# Patient Record
Sex: MALE | Race: BLACK OR AFRICAN AMERICAN | NOT HISPANIC OR LATINO | URBAN - METROPOLITAN AREA
[De-identification: names, ages, dates, MRNs, and addresses within clinical notes are randomized per-mention and may not be internally consistent; named-entity substitution may affect disease eponyms.]

---

## 2017-10-28 ENCOUNTER — EMERGENCY (EMERGENCY)
Facility: HOSPITAL | Age: 27
LOS: 1 days | Discharge: HOME | End: 2017-10-28
Attending: EMERGENCY MEDICINE
Payer: MEDICAID

## 2017-10-28 VITALS
DIASTOLIC BLOOD PRESSURE: 84 MMHG | RESPIRATION RATE: 20 BRPM | WEIGHT: 300.05 LBS | HEART RATE: 95 BPM | SYSTOLIC BLOOD PRESSURE: 164 MMHG | TEMPERATURE: 99 F | OXYGEN SATURATION: 97 %

## 2017-10-28 PROCEDURE — 99283 EMERGENCY DEPT VISIT LOW MDM: CPT

## 2017-10-28 PROCEDURE — 99283 EMERGENCY DEPT VISIT LOW MDM: CPT | Mod: 25

## 2017-10-28 RX ORDER — ALBUTEROL 90 UG/1
2 AEROSOL, METERED ORAL
Qty: 1 | Refills: 0 | OUTPATIENT
Start: 2017-10-28 | End: 2017-11-27

## 2017-10-28 RX ORDER — ALBUTEROL 90 UG/1
2.5 AEROSOL, METERED ORAL
Qty: 0 | Refills: 0 | Status: DISCONTINUED | OUTPATIENT
Start: 2017-10-28 | End: 2017-11-01

## 2017-10-28 NOTE — ED PROVIDER NOTE - OBJECTIVE STATEMENT
27 y/o male with PMHx asthma presents to the ED with c/o SOB, onset 5 hours ago. Pt admits to being around a smoke filled environment tonight. Reports chest tightness but denies smoking, n/v, fever, chills, and any other acute symptoms and complaints at this time.

## 2017-10-28 NOTE — ED ADULT NURSE NOTE - NS ED NURSE DC INFO COMPLEXITY
Returned Demonstration/Simple: Patient demonstrates quick and easy understanding/Patient asked questions

## 2017-10-28 NOTE — ED PROVIDER NOTE - CONSTITUTIONAL, MLM
normal... Well appearing, overweight, well nourished, awake, alert, oriented to person, place, time/situation and in no apparent distress.

## 2019-08-14 ENCOUNTER — EMERGENCY (EMERGENCY)
Facility: HOSPITAL | Age: 29
LOS: 1 days | End: 2019-08-14
Admitting: EMERGENCY MEDICINE
Payer: MEDICAID

## 2019-08-14 PROCEDURE — 76700 US EXAM ABDOM COMPLETE: CPT | Mod: 26

## 2019-08-14 PROCEDURE — 99285 EMERGENCY DEPT VISIT HI MDM: CPT

## 2019-09-17 ENCOUNTER — EMERGENCY (EMERGENCY)
Facility: HOSPITAL | Age: 29
LOS: 1 days | End: 2019-09-17
Admitting: EMERGENCY MEDICINE
Payer: MEDICAID

## 2019-09-17 PROCEDURE — 99284 EMERGENCY DEPT VISIT MOD MDM: CPT

## 2019-12-23 ENCOUNTER — EMERGENCY (EMERGENCY)
Facility: HOSPITAL | Age: 29
LOS: 1 days | End: 2019-12-23
Admitting: EMERGENCY MEDICINE
Payer: SELF-PAY

## 2019-12-23 PROCEDURE — 99053 MED SERV 10PM-8AM 24 HR FAC: CPT

## 2019-12-23 PROCEDURE — 99282 EMERGENCY DEPT VISIT SF MDM: CPT

## 2020-02-14 ENCOUNTER — EMERGENCY (EMERGENCY)
Facility: HOSPITAL | Age: 30
LOS: 1 days | End: 2020-02-14
Admitting: EMERGENCY MEDICINE
Payer: MEDICAID

## 2020-02-14 PROCEDURE — 99284 EMERGENCY DEPT VISIT MOD MDM: CPT

## 2020-04-22 ENCOUNTER — EMERGENCY (EMERGENCY)
Facility: HOSPITAL | Age: 30
LOS: 1 days | End: 2020-04-22
Admitting: EMERGENCY MEDICINE
Payer: MEDICAID

## 2020-04-22 PROCEDURE — 99285 EMERGENCY DEPT VISIT HI MDM: CPT

## 2020-04-22 PROCEDURE — 71045 X-RAY EXAM CHEST 1 VIEW: CPT | Mod: 26

## 2020-04-22 PROCEDURE — 74177 CT ABD & PELVIS W/CONTRAST: CPT | Mod: 26

## 2020-04-22 PROCEDURE — 76705 ECHO EXAM OF ABDOMEN: CPT | Mod: 26

## 2020-11-30 ENCOUNTER — EMERGENCY (EMERGENCY)
Facility: HOSPITAL | Age: 30
LOS: 1 days | End: 2020-11-30
Admitting: EMERGENCY MEDICINE
Payer: MEDICAID

## 2020-11-30 PROCEDURE — 74177 CT ABD & PELVIS W/CONTRAST: CPT | Mod: 26

## 2020-11-30 PROCEDURE — 99285 EMERGENCY DEPT VISIT HI MDM: CPT

## 2021-01-15 ENCOUNTER — TRANSCRIPTION ENCOUNTER (OUTPATIENT)
Age: 31
End: 2021-01-15

## 2021-02-10 ENCOUNTER — EMERGENCY (EMERGENCY)
Facility: HOSPITAL | Age: 31
LOS: 1 days | End: 2021-02-10
Admitting: EMERGENCY MEDICINE
Payer: MEDICAID

## 2021-02-10 PROCEDURE — 71045 X-RAY EXAM CHEST 1 VIEW: CPT | Mod: 26

## 2021-02-10 PROCEDURE — 99284 EMERGENCY DEPT VISIT MOD MDM: CPT

## 2021-04-07 ENCOUNTER — EMERGENCY (EMERGENCY)
Facility: HOSPITAL | Age: 31
LOS: 1 days | End: 2021-04-07
Admitting: EMERGENCY MEDICINE
Payer: MEDICAID

## 2021-04-07 PROCEDURE — 71045 X-RAY EXAM CHEST 1 VIEW: CPT | Mod: 26

## 2021-04-07 PROCEDURE — 93010 ELECTROCARDIOGRAM REPORT: CPT

## 2021-04-07 PROCEDURE — 99284 EMERGENCY DEPT VISIT MOD MDM: CPT

## 2021-04-24 PROBLEM — J45.909 UNSPECIFIED ASTHMA, UNCOMPLICATED: Chronic | Status: ACTIVE | Noted: 2017-10-28

## 2021-05-07 ENCOUNTER — APPOINTMENT (OUTPATIENT)
Dept: SURGERY | Facility: CLINIC | Age: 31
End: 2021-05-07
Payer: MEDICAID

## 2021-05-07 VITALS
BODY MASS INDEX: 44.1 KG/M2 | OXYGEN SATURATION: 97 % | RESPIRATION RATE: 18 BRPM | WEIGHT: 315 LBS | TEMPERATURE: 97.7 F | HEIGHT: 71 IN | HEART RATE: 86 BPM | DIASTOLIC BLOOD PRESSURE: 94 MMHG | SYSTOLIC BLOOD PRESSURE: 157 MMHG

## 2021-05-07 DIAGNOSIS — F17.200 NICOTINE DEPENDENCE, UNSPECIFIED, UNCOMPLICATED: ICD-10-CM

## 2021-05-07 DIAGNOSIS — R10.11 RIGHT UPPER QUADRANT PAIN: ICD-10-CM

## 2021-05-07 DIAGNOSIS — Z82.5 FAMILY HISTORY OF ASTHMA AND OTHER CHRONIC LOWER RESPIRATORY DISEASES: ICD-10-CM

## 2021-05-07 DIAGNOSIS — K51.00 ULCERATIVE (CHRONIC) PANCOLITIS W/OUT COMPLICATIONS: ICD-10-CM

## 2021-05-07 DIAGNOSIS — Z82.0 FAMILY HISTORY OF EPILEPSY AND OTHER DISEASES OF THE NERVOUS SYSTEM: ICD-10-CM

## 2021-05-07 DIAGNOSIS — D64.9 ANEMIA, UNSPECIFIED: ICD-10-CM

## 2021-05-07 DIAGNOSIS — Z82.49 FAMILY HISTORY OF ISCHEMIC HEART DISEASE AND OTHER DISEASES OF THE CIRCULATORY SYSTEM: ICD-10-CM

## 2021-05-07 DIAGNOSIS — Z87.898 PERSONAL HISTORY OF OTHER SPECIFIED CONDITIONS: ICD-10-CM

## 2021-05-07 DIAGNOSIS — Z83.3 FAMILY HISTORY OF DIABETES MELLITUS: ICD-10-CM

## 2021-05-07 DIAGNOSIS — K80.20 CALCULUS OF GALLBLADDER W/OUT CHOLECYSTITIS W/OUT OBSTRUCTION: ICD-10-CM

## 2021-05-07 PROBLEM — Z00.00 ENCOUNTER FOR PREVENTIVE HEALTH EXAMINATION: Status: ACTIVE | Noted: 2021-05-07

## 2021-05-07 PROCEDURE — 99072 ADDL SUPL MATRL&STAF TM PHE: CPT

## 2021-05-07 PROCEDURE — 99244 OFF/OP CNSLTJ NEW/EST MOD 40: CPT

## 2021-05-07 RX ORDER — FLUTICASONE FUROATE AND VILANTEROL TRIFENATATE 50; 25 UG/1; UG/1
POWDER RESPIRATORY (INHALATION)
Refills: 0 | Status: ACTIVE | COMMUNITY

## 2021-05-07 RX ORDER — ALBUTEROL SULFATE 2.5 MG/.5ML
SOLUTION RESPIRATORY (INHALATION)
Refills: 0 | Status: ACTIVE | COMMUNITY

## 2021-05-07 NOTE — HISTORY OF PRESENT ILLNESS
[de-identified] : The patient comes to the office in consultation by Dr. Radha Vizcaino for evaluation of right upper abdominal and gallstones.  The patient reports that he developed the acute onset of sharp stabbing right upper abdominal pain after eating fast food.  He also states that at the time of the pain he was having loose stools with nausea.  He denies any fevers or chills.  He states that he was seen and released from the ER at [facility] with a diagnosis of pan colitis and gallstones.

## 2021-05-07 NOTE — CONSULT LETTER
[Dear  ___] : Dear  [unfilled], [Consult Letter:] : I had the pleasure of evaluating your patient, [unfilled]. [Please see my note below.] : Please see my note below. [Consult Closing:] : Thank you very much for allowing me to participate in the care of this patient.  If you have any questions, please do not hesitate to contact me. [Sincerely,] : Sincerely, [FreeTextEntry3] : James Rahamn MD, FACS\par  \par Department of Surgery\par [facility]\par [facility]\par

## 2021-05-07 NOTE — DATA REVIEWED
[FreeTextEntry1] : Independent review of the abdominal ultrasound reveals gallstones\par \par Independent review of the abd/pel CT scan reveals findings compatible with a pancolitis, no free air, no free fluid, no bowel obstruction

## 2021-05-07 NOTE — ASSESSMENT
[FreeTextEntry1] : The patient is a morbidly obese 30 year old male with a history of abdominal pain that was in the right upper quadrant.  The patient was found to have gallstones on ultrasound, but CT imaging also revealed pancolitis.  The patient has been advised that he will need a GI consultation and colon evaluation to rule out IBD given the pancolitis seen on CT scan.  He may still benefit from a cholecystectomy given the gallstones.  The risks, benefits, and alternatives including the option of doing nothing to a robotic, possible laparoscopic, and possible open cholecystectomy with an open umbilical hernia repair were discussed.  The potential complications including but not limited to infection, bleeding, bile leak, bowel injury and/or obstruction, and possible bile duct injury were discussed.  The patient understands.  We will revisit the option for cholecystectomy once he completes his colon evaluation.   A total of 60 minutes was spent coordinating the care of the patient.

## 2021-05-07 NOTE — PHYSICAL EXAM
[JVD] : no jugular venous distention  [No Rash or Lesion] : No rash or lesion [Purpura] : no purpura  [Petechiae] : no petechiae [Skin Ulcer] : no ulcer [Alert] : not alert [Oriented to Person] : oriented to person [Oriented to Place] : oriented to place [Oriented to Time] : oriented to time [Calm] : calm [de-identified] : non toxic, in no acute distress  [de-identified] : NC/AT PERRL EOMI no scleral icterus  [de-identified] : trachea midline, no gross mass  [de-identified] : no audible wheezing or stridor  [de-identified] : morbidly obese without localizing tenderness, no guarding, no rebound, no masses  [de-identified] : FROM of the extremities with no gross deformity or angulation, there is no lymphadenopathy, there is no ventral hernia but there is a reducible umbilical hernia  [de-identified] : mood is calm

## 2021-07-15 ENCOUNTER — TRANSCRIPTION ENCOUNTER (OUTPATIENT)
Age: 31
End: 2021-07-15

## 2021-08-11 ENCOUNTER — EMERGENCY (EMERGENCY)
Facility: HOSPITAL | Age: 31
LOS: 1 days | End: 2021-08-11
Admitting: EMERGENCY MEDICINE
Payer: MEDICAID

## 2021-08-11 PROCEDURE — 99284 EMERGENCY DEPT VISIT MOD MDM: CPT

## 2021-08-11 PROCEDURE — 93010 ELECTROCARDIOGRAM REPORT: CPT

## 2021-08-11 PROCEDURE — 71045 X-RAY EXAM CHEST 1 VIEW: CPT | Mod: 26

## 2021-08-26 ENCOUNTER — TRANSCRIPTION ENCOUNTER (OUTPATIENT)
Age: 31
End: 2021-08-26

## 2021-10-08 ENCOUNTER — TRANSCRIPTION ENCOUNTER (OUTPATIENT)
Age: 31
End: 2021-10-08

## 2021-10-13 ENCOUNTER — EMERGENCY (EMERGENCY)
Facility: HOSPITAL | Age: 31
LOS: 1 days | End: 2021-10-13
Admitting: EMERGENCY MEDICINE
Payer: MEDICAID

## 2021-10-13 PROCEDURE — 99284 EMERGENCY DEPT VISIT MOD MDM: CPT

## 2021-11-25 ENCOUNTER — EMERGENCY (EMERGENCY)
Facility: HOSPITAL | Age: 31
LOS: 1 days | End: 2021-11-25
Admitting: EMERGENCY MEDICINE
Payer: MEDICAID

## 2021-11-25 PROCEDURE — 71045 X-RAY EXAM CHEST 1 VIEW: CPT | Mod: 26

## 2021-11-25 PROCEDURE — 99284 EMERGENCY DEPT VISIT MOD MDM: CPT

## 2021-12-24 ENCOUNTER — TRANSCRIPTION ENCOUNTER (OUTPATIENT)
Age: 31
End: 2021-12-24

## 2022-01-31 ENCOUNTER — APPOINTMENT (OUTPATIENT)
Dept: CARDIOLOGY | Facility: CLINIC | Age: 32
End: 2022-01-31

## 2022-03-05 ENCOUNTER — TRANSCRIPTION ENCOUNTER (OUTPATIENT)
Age: 32
End: 2022-03-05

## 2022-04-06 ENCOUNTER — APPOINTMENT (OUTPATIENT)
Dept: PULMONOLOGY | Facility: CLINIC | Age: 32
End: 2022-04-06

## 2022-04-22 ENCOUNTER — APPOINTMENT (OUTPATIENT)
Dept: PULMONOLOGY | Facility: CLINIC | Age: 32
End: 2022-04-22

## 2022-06-02 ENCOUNTER — APPOINTMENT (OUTPATIENT)
Dept: PULMONOLOGY | Facility: CLINIC | Age: 32
End: 2022-06-02

## 2022-06-03 ENCOUNTER — EMERGENCY (EMERGENCY)
Facility: HOSPITAL | Age: 32
LOS: 1 days | Discharge: HOME | End: 2022-06-03
Attending: EMERGENCY MEDICINE
Payer: COMMERCIAL

## 2022-06-03 VITALS
TEMPERATURE: 98 F | HEART RATE: 99 BPM | WEIGHT: 315 LBS | SYSTOLIC BLOOD PRESSURE: 155 MMHG | RESPIRATION RATE: 20 BRPM | HEIGHT: 71 IN | DIASTOLIC BLOOD PRESSURE: 91 MMHG | OXYGEN SATURATION: 96 %

## 2022-06-03 LAB
FLUAV AG NPH QL: SIGNIFICANT CHANGE UP
FLUBV AG NPH QL: SIGNIFICANT CHANGE UP
RSV RNA NPH QL NAA+NON-PROBE: SIGNIFICANT CHANGE UP
S PYO DNA THROAT QL NAA+PROBE: SIGNIFICANT CHANGE UP
SARS-COV-2 RNA SPEC QL NAA+PROBE: SIGNIFICANT CHANGE UP

## 2022-06-03 PROCEDURE — 71046 X-RAY EXAM CHEST 2 VIEWS: CPT

## 2022-06-03 PROCEDURE — 99283 EMERGENCY DEPT VISIT LOW MDM: CPT

## 2022-06-03 PROCEDURE — 87798 DETECT AGENT NOS DNA AMP: CPT

## 2022-06-03 PROCEDURE — 71046 X-RAY EXAM CHEST 2 VIEWS: CPT | Mod: 26

## 2022-06-03 PROCEDURE — 87637 SARSCOV2&INF A&B&RSV AMP PRB: CPT

## 2022-06-03 PROCEDURE — 99284 EMERGENCY DEPT VISIT MOD MDM: CPT

## 2022-06-03 PROCEDURE — 87651 STREP A DNA AMP PROBE: CPT

## 2022-06-03 RX ORDER — IBUPROFEN 200 MG
600 TABLET ORAL ONCE
Refills: 0 | Status: COMPLETED | OUTPATIENT
Start: 2022-06-03 | End: 2022-06-03

## 2022-06-03 RX ADMIN — Medication 600 MILLIGRAM(S): at 10:28

## 2022-06-03 NOTE — ED PROVIDER NOTE - NS ED ATTENDING STATEMENT MOD
This was a shared visit with the QUYEN. I reviewed and verified the documentation and independently performed the documented: Attending Only

## 2022-06-03 NOTE — ED ADULT TRIAGE NOTE - CHIEF COMPLAINT QUOTE
Pt c/o cough and throat pain x 3 days.  Pt endorsing fever and chills.  Multiple family members sick at home with same.  No medication taken for symptoms

## 2022-06-03 NOTE — ED PROVIDER NOTE - ATTENDING APP SHARED VISIT CONTRIBUTION OF CARE
I, Monica Huang, performed a face to face bedside interview with this patient regarding history of present illness, review of symptoms and relevant past medical, social and family history.  I completed an independent physical examination. Medical decision making, follow-up on ordered tests (ie labs, radiologic studies) and re-evaluation of the patient's status has been communicated to the ACP.  Disposition of the patient will be based on test outcome and response to ED interventions.

## 2022-06-03 NOTE — ED PROVIDER NOTE - NSFOLLOWUPINSTRUCTIONS_ED_ALL_ED_FT
1. Follow up with your primary care physician within 2-3days for reevaluation.  2.  Return to the Emergency Department for worsening, progressive or any other concerning symptoms.   3. Please take Motrin 600mg by mouth every 6 hours as needed for pain. Please take this medication with food.     Viral Respiratory Infection    A viral respiratory infection is an illness that affects parts of the body used for breathing, like the lungs, nose, and throat. It is caused by a germ called a virus. Symptoms can include runny nose, coughing, sneezing, fatigue, body aches, sore throat, fever, or headache. Over the counter medicine can be used to manage the symptoms but the infection typically goes away on its own in 5 to 10 days.     SEEK IMMEDIATE MEDICAL CARE IF YOU HAVE ANY OF THE FOLLOWING SYMPTOMS: shortness of breath, chest pain, fever over 10 days, or lightheadedness/dizziness.

## 2022-06-03 NOTE — ED ADULT NURSE NOTE - OBJECTIVE STATEMENT
pt alert and oriented x comes in co sore throat, congested, cough, states he took nyquil. family with similar symptons. respiration even unlabored.

## 2022-06-03 NOTE — ED PROVIDER NOTE - PHYSICAL EXAMINATION
Gen: NAD, AOx3  Head: NCAT  HEENT:oral mucosa moist, normal conjunctiva, oropharynx clear without exudate or erythema  Lung: CTAB, no respiratory distress, no wheezing, rales, rhonchi  CV: normal s1/s2, rrr, no murmurs, Normal perfusion, pulses 2+ throughout  Abd: soft, NTND  MSK: No edema, no visible deformities, full range of motion in all 4 extremities  Neuro: No focal neurologic deficits  Skin: No rash   Psych: normal affect

## 2022-06-03 NOTE — ED PROVIDER NOTE - PATIENT PORTAL LINK FT
You can access the FollowMyHealth Patient Portal offered by [facility] by registering at the following website: [email or web address]. By joining [employer]’s FollowMyHealth portal, you will also be able to view your health information using other applications (apps) compatible with our system.

## 2022-06-03 NOTE — ED PROVIDER NOTE - OBJECTIVE STATEMENT
32yo male with 3 days of sore throat, cough difficulty swallowing. Patient states he thinks he had a fever which is now resolved. Cough is productive with phlegm. No vomiting, nausea, diarrhea. No shortness of breath. Vaccinated for covid x 3. +sick contacts.

## 2022-06-07 ENCOUNTER — EMERGENCY (EMERGENCY)
Facility: HOSPITAL | Age: 32
LOS: 1 days | Discharge: HOME | End: 2022-06-07
Attending: STUDENT IN AN ORGANIZED HEALTH CARE EDUCATION/TRAINING PROGRAM
Payer: COMMERCIAL

## 2022-06-07 VITALS
HEART RATE: 94 BPM | WEIGHT: 315 LBS | SYSTOLIC BLOOD PRESSURE: 147 MMHG | TEMPERATURE: 99 F | HEIGHT: 71 IN | RESPIRATION RATE: 18 BRPM | OXYGEN SATURATION: 97 % | DIASTOLIC BLOOD PRESSURE: 86 MMHG

## 2022-06-07 LAB
RAPID RVP RESULT: SIGNIFICANT CHANGE UP
S PYO DNA THROAT QL NAA+PROBE: SIGNIFICANT CHANGE UP
SARS-COV-2 RNA SPEC QL NAA+PROBE: SIGNIFICANT CHANGE UP

## 2022-06-07 PROCEDURE — 36415 COLL VENOUS BLD VENIPUNCTURE: CPT

## 2022-06-07 PROCEDURE — 0225U NFCT DS DNA&RNA 21 SARSCOV2: CPT

## 2022-06-07 PROCEDURE — 87798 DETECT AGENT NOS DNA AMP: CPT

## 2022-06-07 PROCEDURE — 99283 EMERGENCY DEPT VISIT LOW MDM: CPT

## 2022-06-07 PROCEDURE — 99284 EMERGENCY DEPT VISIT MOD MDM: CPT

## 2022-06-07 PROCEDURE — 86308 HETEROPHILE ANTIBODY SCREEN: CPT

## 2022-06-07 PROCEDURE — 87651 STREP A DNA AMP PROBE: CPT

## 2022-06-07 NOTE — ED PROVIDER NOTE - OBJECTIVE STATEMENT
30 y/o M with PMHx asthma who presents to the ED for sore throat for the past week. Per EMR, was here four days ago for similar complaint, now returns because symptoms have not resolved in addition stating he now has pain in both ears. Had a negative RVP with COVID and Strep test and CXR and was discharged. States that he is vaccinated for COVID. Denies sick contacts. Denies any other interval symptoms or other complaints.

## 2022-06-07 NOTE — ED PROVIDER NOTE - PROGRESS NOTE DETAILS
Repeat testing sent, pending. Scripts for prednisone and Augmentin sent to patient's pharmacy. In no distress. VSS. Stable for d/c with return precautions and ENT f/u. - Patrizia

## 2022-06-07 NOTE — ED ADULT NURSE NOTE - OBJECTIVE STATEMENT
Pt c/o sore throat, fever, generalized malaise x 3 weeks, seen in ED 4 days ago for same, all tests negative, pt states "im just not getting any better", AOx3, resp even and unlabored, cough noted

## 2022-06-07 NOTE — ED PROVIDER NOTE - PATIENT PORTAL LINK FT
You can access the FollowMyHealth Patient Portal offered by [facility] by registering at the following website: [email or web address]. By joining [employer]’s FollowMyHealth portal, you will also be able to view your health information using other applications (apps) compatible with our system.

## 2022-06-07 NOTE — ED ADULT TRIAGE NOTE - CHIEF COMPLAINT QUOTE
Pt with sore throat for 1 week. Was seen here last week and negative for strep and flu. Pt reports pain has not resolved and now also has pain in both ears.

## 2022-06-07 NOTE — ED PROVIDER NOTE - PHYSICAL EXAMINATION
General: NAD  Head: NC, AT  EENT: no scleral icterus, + lymphadenopathy, throat slightly erythematous, no tonsillar exudates, no lesions  Cardiac: RRR, no apparent murmurs, no lower extremity edema  Respiratory: CTABL, no respiratory distress   Abdomen: soft, ND, NT, nonperitonitic  MSK/Vascular: soft compartments, warm extremities  Neuro: AAOx3, sensation to light touch intact  Psych: calm, cooperative

## 2022-06-07 NOTE — ED PROVIDER NOTE - NS ED ROS FT
Constitutional: no fever, + chills  Head: NC, AT   Eyes: no redness   ENMT: no nasal congestion/drainage, + sore throat   CV: no chest pain, no edema  Resp: + cough, no dyspnea  GI: no abdominal pain, no nausea, no vomiting, no diarrhea  : no dysuria, no hematuria   Skin: no lesions, no rashes   Neuro: no LOC, no headache, no sensory deficits, no weakness

## 2022-06-07 NOTE — ED PROVIDER NOTE - ATTENDING CONTRIBUTION TO CARE
32 y/o M with PMHx asthma who presents to the ED for sore throat for the past week. Per EMR, was here four days ago for similar complaint, now returns because symptoms have not resolved. Had a negative RVP with COVID and Strep test and CXR and was discharged. Pain on swallowing but able to swallow. notes he recently had general anesthesia for surgery and pulled out his ET tube and since then has also noted mild throat irritation.   AP - nontoxic appearing, tonsils mildly erythma but equal in size. +cervical LAD. will reswab for strep. treat supportively and discussed need for ENT f/u

## 2022-06-08 LAB — HETEROPH AB TITR SER AGGL: NEGATIVE — SIGNIFICANT CHANGE UP

## 2022-06-10 NOTE — ED ADULT TRIAGE NOTE - CCCP TRG CHIEF CMPLNT
Addended by: CHRIS HENDRICKS on: 6/10/2022 10:19 AM     Modules accepted: Orders    
cough/sore throat

## 2022-06-24 ENCOUNTER — APPOINTMENT (OUTPATIENT)
Dept: CARDIOLOGY | Facility: CLINIC | Age: 32
End: 2022-06-24

## 2022-06-30 ENCOUNTER — APPOINTMENT (OUTPATIENT)
Dept: PULMONOLOGY | Facility: CLINIC | Age: 32
End: 2022-06-30

## 2022-08-02 ENCOUNTER — APPOINTMENT (OUTPATIENT)
Dept: PULMONOLOGY | Facility: CLINIC | Age: 32
End: 2022-08-02

## 2022-08-09 ENCOUNTER — APPOINTMENT (OUTPATIENT)
Dept: CARDIOLOGY | Facility: CLINIC | Age: 32
End: 2022-08-09

## 2022-08-09 DIAGNOSIS — Z87.09 PERSONAL HISTORY OF OTHER DISEASES OF THE RESPIRATORY SYSTEM: ICD-10-CM

## 2022-08-09 RX ORDER — FLUTICASONE FUROATE AND VILANTEROL 100; 25 UG/1; UG/1
100-25 POWDER RESPIRATORY (INHALATION)
Qty: 60 | Refills: 0 | Status: ACTIVE | COMMUNITY
Start: 2022-07-05

## 2022-08-09 RX ORDER — ALBUTEROL SULFATE 90 UG/1
108 (90 BASE) INHALANT RESPIRATORY (INHALATION)
Qty: 8 | Refills: 0 | Status: ACTIVE | COMMUNITY
Start: 2022-01-21

## 2022-08-09 RX ORDER — AMLODIPINE BESYLATE 5 MG/1
5 TABLET ORAL
Qty: 30 | Refills: 0 | Status: ACTIVE | COMMUNITY
Start: 2022-04-06

## 2022-08-11 ENCOUNTER — EMERGENCY (EMERGENCY)
Facility: HOSPITAL | Age: 32
LOS: 1 days | Discharge: LEFT BEFORE DISCHARGE | End: 2022-08-11
Attending: EMERGENCY MEDICINE
Payer: COMMERCIAL

## 2022-08-11 VITALS
TEMPERATURE: 99 F | HEIGHT: 71 IN | WEIGHT: 315 LBS | SYSTOLIC BLOOD PRESSURE: 194 MMHG | OXYGEN SATURATION: 99 % | DIASTOLIC BLOOD PRESSURE: 96 MMHG | RESPIRATION RATE: 16 BRPM | HEART RATE: 94 BPM

## 2022-08-11 VITALS
DIASTOLIC BLOOD PRESSURE: 84 MMHG | OXYGEN SATURATION: 100 % | TEMPERATURE: 98 F | RESPIRATION RATE: 20 BRPM | HEART RATE: 66 BPM | SYSTOLIC BLOOD PRESSURE: 162 MMHG

## 2022-08-11 PROCEDURE — 99282 EMERGENCY DEPT VISIT SF MDM: CPT

## 2022-08-11 PROCEDURE — 99284 EMERGENCY DEPT VISIT MOD MDM: CPT

## 2022-08-11 NOTE — ED ADULT TRIAGE NOTE - CHIEF COMPLAINT QUOTE
Ambulatory sent from  reporting RLQ abdominal pain x2 days, denies N/V/D, urinary symptoms. PMH HTN and cholecystectomy. Has not taken his HTN medications today or medicated for pain.

## 2022-08-11 NOTE — ED PROVIDER NOTE - ATTENDING APP SHARED VISIT CONTRIBUTION OF CARE
I, Celia Rodríguez, performed the initial face to face bedside interview with this patient regarding history of present illness, review of symptoms and relevant past medical, social and family history.  I completed an independent physical examination.  I was the initial provider who evaluated this patient. I have signed out the follow up of any pending tests (i.e. labs, radiological studies) to the ACP.  I have communicated the patient’s plan of care and disposition with the ACP.  The history, relevant review of systems, past medical and surgical history, medical decision making, and physical examination was documented by the scribe in my presence and I attest to the accuracy of the documentation.

## 2022-08-11 NOTE — ED PROVIDER NOTE - CLINICAL SUMMARY MEDICAL DECISION MAKING FREE TEXT BOX
Patient with low back pain radiating around to testicle. No hernia and no tenderness over McBurney's point. UA done at  [facility] with -blood, doubt kidney stone. Possible epididymitis. Will get ultrasound of testicle and CT renal hunt.

## 2022-08-11 NOTE — ED PROVIDER NOTE - OBJECTIVE STATEMENT
32 y/o male with pmhx of cholecystectomy and HTN on amlodipine, c/o right lower abdominal pain radiating down to right testicle x 2 days. Pt states pain worsens with movement. Denies urinary symptoms, or changes to appetite. Pt was seen at  with urine normal. Pt admits to smoking marijuana and today drank 1 deer. Pt states MD lower Amlodipine dosage from 10 mg to 5 mg due to dizziness. Pt admits not taking the Amlodipine at the required time. 30 y/o male with pmhx of cholecystectomy and HTN on amlodipine, c/o right lower abdominal pain radiating down to right testicle x 2 days. Pt states pain worsens with movement. Denies urinary symptoms, or changes to appetite. Pt was seen at  with urine normal. Pt admits to smoking marijuana and today drank 1 beer. Pt states MD lower Amlodipine dosage from 10 mg to 5 mg due to dizziness. Pt admits not taking the Amlodipine at the required time.

## 2022-08-15 ENCOUNTER — EMERGENCY (EMERGENCY)
Facility: HOSPITAL | Age: 32
LOS: 1 days | Discharge: HOME | End: 2022-08-15
Attending: STUDENT IN AN ORGANIZED HEALTH CARE EDUCATION/TRAINING PROGRAM
Payer: COMMERCIAL

## 2022-08-15 VITALS
WEIGHT: 315 LBS | SYSTOLIC BLOOD PRESSURE: 165 MMHG | OXYGEN SATURATION: 98 % | TEMPERATURE: 100 F | HEIGHT: 71 IN | DIASTOLIC BLOOD PRESSURE: 95 MMHG | HEART RATE: 117 BPM

## 2022-08-15 LAB
APPEARANCE UR: CLEAR — SIGNIFICANT CHANGE UP
BACTERIA # UR AUTO: ABNORMAL
BILIRUB UR-MCNC: NEGATIVE — SIGNIFICANT CHANGE UP
COLOR SPEC: YELLOW — SIGNIFICANT CHANGE UP
DIFF PNL FLD: NEGATIVE — SIGNIFICANT CHANGE UP
EPI CELLS # UR: SIGNIFICANT CHANGE UP
GLUCOSE UR QL: NEGATIVE MG/DL — SIGNIFICANT CHANGE UP
KETONES UR-MCNC: ABNORMAL
LEUKOCYTE ESTERASE UR-ACNC: ABNORMAL
NITRITE UR-MCNC: NEGATIVE — SIGNIFICANT CHANGE UP
PH UR: 7 — SIGNIFICANT CHANGE UP (ref 5–8)
PROT UR-MCNC: 15
RAPID RVP RESULT: DETECTED
RBC CASTS # UR COMP ASSIST: SIGNIFICANT CHANGE UP /HPF (ref 0–4)
SARS-COV-2 RNA SPEC QL NAA+PROBE: DETECTED
SP GR SPEC: 1.01 — SIGNIFICANT CHANGE UP (ref 1.01–1.02)
UROBILINOGEN FLD QL: 1 MG/DL
WBC UR QL: SIGNIFICANT CHANGE UP /HPF (ref 0–5)

## 2022-08-15 PROCEDURE — 99284 EMERGENCY DEPT VISIT MOD MDM: CPT

## 2022-08-15 PROCEDURE — 81001 URINALYSIS AUTO W/SCOPE: CPT

## 2022-08-15 PROCEDURE — 0225U NFCT DS DNA&RNA 21 SARSCOV2: CPT

## 2022-08-15 PROCEDURE — 87086 URINE CULTURE/COLONY COUNT: CPT

## 2022-08-15 PROCEDURE — 99285 EMERGENCY DEPT VISIT HI MDM: CPT

## 2022-08-15 RX ORDER — ACETAMINOPHEN 500 MG
975 TABLET ORAL ONCE
Refills: 0 | Status: COMPLETED | OUTPATIENT
Start: 2022-08-15 | End: 2022-08-15

## 2022-08-15 RX ORDER — IBUPROFEN 200 MG
1 TABLET ORAL
Qty: 42 | Refills: 0
Start: 2022-08-15 | End: 2022-08-21

## 2022-08-15 RX ORDER — ACETAMINOPHEN 500 MG
2 TABLET ORAL
Qty: 56 | Refills: 0
Start: 2022-08-15 | End: 2022-08-21

## 2022-08-15 RX ADMIN — Medication 975 MILLIGRAM(S): at 11:01

## 2022-08-15 NOTE — ED STATDOCS - PATIENT PORTAL LINK FT
You can access the FollowMyHealth Patient Portal offered by [facility] by registering at the following website: [email or web address]. By joining [employer]’s FollowMyHealth portal, you will also be able to view your health information using other applications (apps) compatible with our system.

## 2022-08-15 NOTE — ED STATDOCS - ATTENDING CONTRIBUTION TO CARE
I have personally performed a face to face medical and diagnostic evaluation of the patient. I have discussed the case with the resident and reviewed their addition to the note. Please see the HPI, ROS, PE and MDM as written by the scribe in my presence.

## 2022-08-15 NOTE — ED STATDOCS - PHYSICAL EXAMINATION
General: well-appearing, no acute distress  Head: normocephalic, atraumatic  Eyes: PERRL   Mouth: moist mucous membranes  Neck: supple neck, no lymphadenopathy  CV: normal rate and rhythm, no LE edema, peripheral pulses 2+ bilateral UE and LE  Respiratory: clear to auscultation bilaterally  Abdomen: soft, nondistended, nontender  : no suprapubic tenderness, no CVAT  MSK: no joint deformities  Neuro: alert and oriented x3, speech clear, moving all extremities spontaneously without difficulty  Skin: no rash noted General: well-appearing man, elevated BMI, no acute distress  Head: normocephalic, atraumatic  Eyes: clear eyes  Mouth: moist mucous membranes  Neck: supple neck  CV: normal rate and rhythm, no LE edema, peripheral pulses 2+ bilateral UE and LE  Respiratory: clear to auscultation bilaterally  Abdomen: soft, nondistended, nontender  : no suprapubic tenderness, no CVAT  MSK: no joint deformities  Neuro: alert and oriented x3, speech clear, moving all extremities spontaneously without difficulty  Skin: no rash noted

## 2022-08-15 NOTE — ED STATDOCS - PROGRESS NOTE DETAILS
Edilson: Patient seen and evaluated bedside s/p initial work-up by intake physician. Tachy w/ low grade fever, vitals otherwise stable. Presentation concerning for pyelo vs viral infection. Patient pending blood and urine studies, medications. Will follow-up on results and reassess. Edilson: Work up w/o concern for uti, pyelo. Pt reports improvement in pain. Will send Tylenol, IBU to pharmacy. Pt amenable to discharge at this time.

## 2022-08-15 NOTE — ED STATDOCS - NS ED ROS FT
General: + fevers  Head: no headaches  Eyes: no vision change  ENT: +Nasal discharge  CV: no chest pain  Resp: no SOB, + cough  GI: no N/V/D, + abdominal pain, no blood in stool  : no dysuria, no hematuria  MSK: no joint pain, no muscle aches, no neck pain or back pain  Skin: no new rash  Neuro: no focal weakness, no change in sensation General: + fever  Head: no headache  Eyes: no vision change  ENT: +Nasal discharge  CV: no chest pain  Resp: no SOB, + cough  GI: no N/V/D, + abdominal pain  : no dysuria, no hematuria  MSK: no joint pain  Skin: no new rash  Neuro: no focal weakness, no change in sensation

## 2022-08-15 NOTE — ED STATDOCS - CLINICAL SUMMARY MEDICAL DECISION MAKING FREE TEXT BOX
30 y/o pres with fever cough congestion right flank pain x2 days with non tender abdomen. Consider pylo vs. viral infection. Obtain urine RVP give pain meds and reassess. 32 y/o pres with fever, cough, congestion, right flank pain x2 days with nontender abdomen. Consider pyelo vs. viral infection. Obtain urine, RVP, give pain meds, and reassess.

## 2022-08-15 NOTE — ED STATDOCS - OBJECTIVE STATEMENT
30 y/o M with pmhx cholecystectomy, asthma and HTN on amlodipine, presents to the ED c/o R flank pain and fever x2 days. Pt reports he woke up with a fever today. Pt states coughing triggers pain. Pt reports abdominal pain radiates down to his R groin. Denies sore throat. +Cough with sputum. Pt reports he uses an asthma pump daily. Denies dysuria. 30 y/o M with pmhx cholecystectomy, asthma, and HTN on amlodipine, presents to the ED c/o R flank pain x 2 days and fever today. Pt reports pain radiates down to his R groin. Pt states he has been having congestion and cough with white sputum. Denies dysuria or hematuria. No n/v/d, chest pain, SOB.

## 2022-08-16 LAB
CULTURE RESULTS: NO GROWTH — SIGNIFICANT CHANGE UP
SPECIMEN SOURCE: SIGNIFICANT CHANGE UP

## 2022-10-26 ENCOUNTER — APPOINTMENT (OUTPATIENT)
Dept: CARDIOLOGY | Facility: CLINIC | Age: 32
End: 2022-10-26

## 2022-12-08 ENCOUNTER — APPOINTMENT (OUTPATIENT)
Dept: PULMONOLOGY | Facility: CLINIC | Age: 32
End: 2022-12-08

## 2023-01-03 ENCOUNTER — APPOINTMENT (OUTPATIENT)
Dept: PULMONOLOGY | Facility: CLINIC | Age: 33
End: 2023-01-03

## 2023-01-09 ENCOUNTER — EMERGENCY (EMERGENCY)
Facility: HOSPITAL | Age: 33
LOS: 1 days | Discharge: HOME | End: 2023-01-09
Attending: EMERGENCY MEDICINE
Payer: COMMERCIAL

## 2023-01-09 VITALS
RESPIRATION RATE: 16 BRPM | DIASTOLIC BLOOD PRESSURE: 105 MMHG | OXYGEN SATURATION: 96 % | SYSTOLIC BLOOD PRESSURE: 183 MMHG | TEMPERATURE: 98 F | HEART RATE: 98 BPM | WEIGHT: 300.05 LBS

## 2023-01-09 PROCEDURE — 99284 EMERGENCY DEPT VISIT MOD MDM: CPT

## 2023-01-09 NOTE — ED ADULT TRIAGE NOTE - CHIEF COMPLAINT QUOTE
Pt BIBEMS c/o asthma exacerbation this evening. States he feels better since receiving a Combivent treatment in the ambulance. Denies SOB, saturating 96% on RA.

## 2023-01-10 PROCEDURE — 94640 AIRWAY INHALATION TREATMENT: CPT

## 2023-01-10 PROCEDURE — 99283 EMERGENCY DEPT VISIT LOW MDM: CPT | Mod: 25

## 2023-01-10 RX ORDER — IPRATROPIUM/ALBUTEROL SULFATE 18-103MCG
3 AEROSOL WITH ADAPTER (GRAM) INHALATION ONCE
Refills: 0 | Status: COMPLETED | OUTPATIENT
Start: 2023-01-10 | End: 2023-01-10

## 2023-01-10 RX ORDER — ALBUTEROL 90 UG/1
2.5 AEROSOL, METERED ORAL ONCE
Refills: 0 | Status: COMPLETED | OUTPATIENT
Start: 2023-01-10 | End: 2023-01-10

## 2023-01-10 RX ADMIN — Medication 60 MILLIGRAM(S): at 00:21

## 2023-01-10 RX ADMIN — ALBUTEROL 2.5 MILLIGRAM(S): 90 AEROSOL, METERED ORAL at 00:21

## 2023-01-10 RX ADMIN — Medication 3 MILLILITER(S): at 00:23

## 2023-01-10 NOTE — ED PROVIDER NOTE - PATIENT PORTAL LINK FT
You can access the FollowMyHealth Patient Portal offered by [facility] by registering at the following website: [email or web address]. By joining [employer]’s FollowMyHealth portal, you will also be able to view your health information using other applications (apps) compatible with our system.

## 2023-01-10 NOTE — ED PROVIDER NOTE - NSICDXPASTSURGICALHX_GEN_ALL_CORE_FT
PAST SURGICAL HISTORY:  No significant past surgical history      Plan: 1. Reviewed current product use as well as reviewing previous patch testing allergens that were positive. Continue to avoid these.\\n2. Initiate Sernivo- QD PRN for rash- do not apply to face\\n3. Discontinue Eucerin hand cream at this time\\n4. Samples of Vanicream and La Roche Posey [place] given to try Other Instructions: 1. Reviewed current product use as well as reviewing previous patch testing allergens that were positive. Continue to avoid these.\\n2. Initiate Sernivo- QD PRN for rash- do not apply to face\\n3. Discontinue Eucerin hand cream at this time\\n4. Samples of Vanicream and La Roche Posey [place] given to try

## 2023-01-10 NOTE — ED PROVIDER NOTE - NS ED ROS FT
ROS: CONTUSIONAL: Denies fever, chills, fatigue, wt loss. HEAD: Denies trauma, HA, Dizziness. EYE: Denies Acute visual changes, diplopia. ENMT: Denies change in hearing, tinnitus, epistaxis, difficulty swallowing, sore throat. CARDIO: Denies CP, palpitations, edema. RESP: wheeze, Cough, SOB Denies  , Diff breathing, hemoptysis. GI: Denies N/V, ABD pain, change in bowel movement. URINARY: Denies difficulty urinating, pelvic pain. MS:  Denies joint pain, back pain, weakness, decreased ROM, swelling. NEURO: Denies change in gait, seizures, loss of sensation, dizziness, confusion LOC.  PSY: NO SI/HI. SKIN: Denies Rash, bruising.

## 2023-01-10 NOTE — ED ADULT NURSE NOTE - OBJECTIVE STATEMENT
A&Ox4, RR even and unlabored on RA. skin is warm and dry. PT coming to ED for an asthma exacerbation. Received a neb treatment in route to ED by EMS and reports relief at this time. Denies chest pain or SOB. No recent sick contacts

## 2023-01-10 NOTE — ED PROVIDER NOTE - OBJECTIVE STATEMENT
Patient with significant past medical history of asthma presents to the ED for shortness of breath reminiscent of asthma exacerbation x1 day.  Patient states that he had gradual onset of shortness of breath this afternoon with wheezing patient attempted to treat with albuterol both via nebulizer and rescue inhaler with little to no improvement.  Patient states that he has a moderate amount of shortness of breath wheeze without cough.  Patient denies fever chills headache dizziness ear pain throat pain chest pain.

## 2023-01-10 NOTE — ED PROVIDER NOTE - ATTENDING APP SHARED VISIT CONTRIBUTION OF CARE
The patient discussed with DERRICK Crocker I, [place], performed the initial face to face bedside interview with this patient regarding history of present illness, review of symptoms and relevant past medical, social and family history.  I completed an independent physical examination.  I was the initial provider who evaluated this patient. I have signed out the follow up of any pending tests (i.e. labs, radiological studies) to the ACP.  I have communicated the patient’s plan of care and disposition with the ACP.

## 2023-01-10 NOTE — ED PROVIDER NOTE - CLINICAL SUMMARY MEDICAL DECISION MAKING FREE TEXT BOX
Pt with Hx of asthma presenting for similar symptoms to other incidents, Pt with sig clinical improvement with conservative management, Pt with rescue inhaler, will dc home with po steroids, albuterol PRN follow up to PCP, educated about when to return to the ED if needed. PT verbalizes that he understands all instructions and results. Pt informed that ED is open and available 24/7 365 days a yr, encouraged to return to the ED if they have any change in condition, or feel the need for revaluation.

## 2023-01-10 NOTE — ED PROVIDER NOTE - ADDITIONAL NOTES AND INSTRUCTIONS:
PT was evaluated At [facility] ED and was found to have a condition that warranted time of to rest and heal from WORK/SCHOOL.   Anam Linares PA-C

## 2023-01-10 NOTE — ED PROVIDER NOTE - NS ED ATTENDING STATEMENT MOD
This was a shared visit with the QUYEN. I reviewed and verified the documentation and independently performed the documented:

## 2023-01-10 NOTE — ED PROVIDER NOTE - NSFOLLOWUPINSTRUCTIONS_ED_ALL_ED_FT
Asthma    WHAT YOU NEED TO KNOW:    Asthma is a lung disease that makes breathing difficult. Chronic inflammation and reactions to triggers narrow the airways in the lungs. Asthma can become life-threatening if it is not managed.  Normal vs Asthmatic Bronchioles Adult         DISCHARGE INSTRUCTIONS:    Call your local emergency number (911 in the [place]) if:   •You have severe shortness of breath.      •The skin around your neck and ribs pulls in with each breath.      •Your peak flow numbers are in the red zone of your AAP.      Return to the emergency department if:   •You have shortness of breath, even after you take your short-term medicine as directed.      •Your lips or nails turn blue or gray.      Call your doctor or asthma specialist if:   •You run out of medicine before your next refill is due.      •Your symptoms get worse.      •You need to take more medicine than usual to control your symptoms.      •You have questions or concerns about your condition or care.      Medicines:   •Medicines may be used to decrease inflammation, open airways, and make it easier to breathe. Medicines may be inhaled, taken as a pill, or injected. Short-term medicines relieve your symptoms quickly. Long-term medicines are used to prevent future asthma attacks. Other medicines may be needed if your regular medicines are not able to prevent attacks. You may also need medicine to help control your allergies. Ask your healthcare provider for more information about any medicine you are given and how to take it safely.      •Take your medicine as directed. Contact your healthcare provider if you think your medicine is not helping or if you have side effects. Tell your provider if you are allergic to any medicine. Keep a list of the medicines, vitamins, and herbs you take. Include the amounts, and when and why you take them. Bring the list or the pill bottles to follow-up visits. Carry your medicine list with you in case of an emergency.      Manage your symptoms and prevent future attacks:   •Follow your Asthma Action Plan (AAP). This is a written plan that you and your healthcare provider create. It explains which medicine you need and when to change doses if necessary. It also explains how you can monitor symptoms and use a peak flow meter. The meter measures how well your lungs are working.      •Manage other health conditions, such as allergies, acid reflux, and sleep apnea.      •Identify and avoid triggers. These may include pets, dust mites, mold, and cockroaches.      •Do not smoke or be around others who smoke. Nicotine and other chemicals in cigarettes and cigars can cause lung damage. Ask your healthcare provider for information if you currently smoke and need help to quit. E-cigarettes or smokeless tobacco still contain nicotine. Talk to your healthcare provider before you use these products.      •Ask about the flu vaccine. The flu can make your asthma worse. You may need a yearly flu shot.      Follow up with your healthcare provider as directed: You will need to return to make sure your medicine is working and your symptoms are controlled. You may be referred to an asthma or allergy specialist. You may be asked to keep a record of your peak flow values and bring it with you to your appointments. Write down your questions so you remember to ask them during your visits.

## 2023-01-25 ENCOUNTER — APPOINTMENT (OUTPATIENT)
Dept: PULMONOLOGY | Facility: CLINIC | Age: 33
End: 2023-01-25

## 2023-02-02 ENCOUNTER — APPOINTMENT (OUTPATIENT)
Dept: PULMONOLOGY | Facility: CLINIC | Age: 33
End: 2023-02-02
Payer: MEDICAID

## 2023-02-02 VITALS
HEART RATE: 87 BPM | HEIGHT: 71 IN | SYSTOLIC BLOOD PRESSURE: 142 MMHG | OXYGEN SATURATION: 98 % | BODY MASS INDEX: 44.1 KG/M2 | WEIGHT: 315 LBS | DIASTOLIC BLOOD PRESSURE: 88 MMHG | RESPIRATION RATE: 16 BRPM

## 2023-02-02 DIAGNOSIS — G47.30 SLEEP APNEA, UNSPECIFIED: ICD-10-CM

## 2023-02-02 DIAGNOSIS — E66.01 MORBID (SEVERE) OBESITY DUE TO EXCESS CALORIES: ICD-10-CM

## 2023-02-02 DIAGNOSIS — J45.909 UNSPECIFIED ASTHMA, UNCOMPLICATED: ICD-10-CM

## 2023-02-02 DIAGNOSIS — K21.9 GASTRO-ESOPHAGEAL REFLUX DISEASE W/OUT ESOPHAGITIS: ICD-10-CM

## 2023-02-02 PROCEDURE — 99204 OFFICE O/P NEW MOD 45 MIN: CPT

## 2023-02-02 RX ORDER — AMOXICILLIN AND CLAVULANATE POTASSIUM 875; 125 MG/1; MG/1
875-125 TABLET, COATED ORAL
Qty: 10 | Refills: 0 | Status: DISCONTINUED | COMMUNITY
Start: 2022-06-07 | End: 2023-02-02

## 2023-02-02 RX ORDER — ALBUTEROL SULFATE 90 UG/1
108 (90 BASE) AEROSOL, METERED RESPIRATORY (INHALATION)
Refills: 0 | Status: DISCONTINUED | COMMUNITY
End: 2023-02-02

## 2023-02-02 RX ORDER — ALBUTEROL SULFATE 2.5 MG/3ML
(2.5 MG/3ML) SOLUTION RESPIRATORY (INHALATION)
Qty: 120 | Refills: 5 | Status: ACTIVE | COMMUNITY
Start: 2023-02-02 | End: 1900-01-01

## 2023-02-02 RX ORDER — BENZONATATE 200 MG/1
200 CAPSULE ORAL
Qty: 20 | Refills: 0 | Status: DISCONTINUED | COMMUNITY
Start: 2022-06-02 | End: 2023-02-02

## 2023-02-02 RX ORDER — PREDNISONE 50 MG/1
50 TABLET ORAL
Qty: 5 | Refills: 0 | Status: DISCONTINUED | COMMUNITY
Start: 2022-06-07 | End: 2023-02-02

## 2023-02-02 NOTE — PHYSICAL EXAM
[No Acute Distress] : no acute distress [Elongated Uvula] : elongated uvula [Enlarged Base of the Tongue] : enlarged base of the tongue [III] : Mallampati Class: III [Normal Appearance] : normal appearance [Neck Circumference: ___] : neck circumference: [unfilled] [No Neck Mass] : no neck mass [Normal Rate/Rhythm] : normal rate/rhythm [Normal S1, S2] : normal s1, s2 [No Resp Distress] : no resp distress [Clear to Auscultation Bilaterally] : clear to auscultation bilaterally

## 2023-02-02 NOTE — CONSULT LETTER
[Dear  ___] : Dear  [unfilled], [Consult Letter:] : I had the pleasure of evaluating your patient, [unfilled]. [Please see my note below.] : Please see my note below. [Consult Closing:] : Thank you very much for allowing me to participate in the care of this patient.  If you have any questions, please do not hesitate to contact me. [Sincerely,] : Sincerely, [DrNicole  ___] : Dr. HARRISON

## 2023-02-04 ENCOUNTER — EMERGENCY (EMERGENCY)
Facility: HOSPITAL | Age: 33
LOS: 1 days | Discharge: HOME | End: 2023-02-04
Attending: EMERGENCY MEDICINE
Payer: COMMERCIAL

## 2023-02-04 VITALS
DIASTOLIC BLOOD PRESSURE: 80 MMHG | TEMPERATURE: 98 F | HEART RATE: 85 BPM | OXYGEN SATURATION: 98 % | SYSTOLIC BLOOD PRESSURE: 151 MMHG | RESPIRATION RATE: 18 BRPM

## 2023-02-04 VITALS
OXYGEN SATURATION: 98 % | WEIGHT: 315 LBS | HEART RATE: 106 BPM | HEIGHT: 71 IN | RESPIRATION RATE: 18 BRPM | SYSTOLIC BLOOD PRESSURE: 181 MMHG | TEMPERATURE: 99 F | DIASTOLIC BLOOD PRESSURE: 99 MMHG

## 2023-02-04 LAB
ALBUMIN SERPL ELPH-MCNC: 4.4 G/DL — SIGNIFICANT CHANGE UP (ref 3.3–5.2)
ALP SERPL-CCNC: 88 U/L — SIGNIFICANT CHANGE UP (ref 40–120)
ALT FLD-CCNC: 24 U/L — SIGNIFICANT CHANGE UP
ANION GAP SERPL CALC-SCNC: 13 MMOL/L — SIGNIFICANT CHANGE UP (ref 5–17)
ANISOCYTOSIS BLD QL: SIGNIFICANT CHANGE UP
AST SERPL-CCNC: 19 U/L — SIGNIFICANT CHANGE UP
BASOPHILS # BLD AUTO: 0.09 K/UL — SIGNIFICANT CHANGE UP (ref 0–0.2)
BASOPHILS NFR BLD AUTO: 0.9 % — SIGNIFICANT CHANGE UP (ref 0–2)
BILIRUB SERPL-MCNC: 0.4 MG/DL — SIGNIFICANT CHANGE UP (ref 0.4–2)
BUN SERPL-MCNC: 12.6 MG/DL — SIGNIFICANT CHANGE UP (ref 8–20)
CALCIUM SERPL-MCNC: 9.3 MG/DL — SIGNIFICANT CHANGE UP (ref 8.4–10.5)
CHLORIDE SERPL-SCNC: 102 MMOL/L — SIGNIFICANT CHANGE UP (ref 96–108)
CO2 SERPL-SCNC: 27 MMOL/L — SIGNIFICANT CHANGE UP (ref 22–29)
CREAT SERPL-MCNC: 0.83 MG/DL — SIGNIFICANT CHANGE UP (ref 0.5–1.3)
EGFR: 120 ML/MIN/1.73M2 — SIGNIFICANT CHANGE UP
ELLIPTOCYTES BLD QL SMEAR: SLIGHT — SIGNIFICANT CHANGE UP
EOSINOPHIL # BLD AUTO: 0.18 K/UL — SIGNIFICANT CHANGE UP (ref 0–0.5)
EOSINOPHIL NFR BLD AUTO: 1.7 % — SIGNIFICANT CHANGE UP (ref 0–6)
GIANT PLATELETS BLD QL SMEAR: PRESENT — SIGNIFICANT CHANGE UP
GLUCOSE SERPL-MCNC: 98 MG/DL — SIGNIFICANT CHANGE UP (ref 70–99)
HCT VFR BLD CALC: 36.6 % — LOW (ref 39–50)
HGB BLD-MCNC: 10.9 G/DL — LOW (ref 13–17)
HYPOCHROMIA BLD QL: SLIGHT — SIGNIFICANT CHANGE UP
LYMPHOCYTES # BLD AUTO: 4.21 K/UL — HIGH (ref 1–3.3)
LYMPHOCYTES # BLD AUTO: 40.3 % — SIGNIFICANT CHANGE UP (ref 13–44)
MANUAL SMEAR VERIFICATION: SIGNIFICANT CHANGE UP
MCHC RBC-ENTMCNC: 19.2 PG — LOW (ref 27–34)
MCHC RBC-ENTMCNC: 29.8 GM/DL — LOW (ref 32–36)
MCV RBC AUTO: 64.6 FL — LOW (ref 80–100)
MICROCYTES BLD QL: SIGNIFICANT CHANGE UP
MONOCYTES # BLD AUTO: 0.46 K/UL — SIGNIFICANT CHANGE UP (ref 0–0.9)
MONOCYTES NFR BLD AUTO: 4.4 % — SIGNIFICANT CHANGE UP (ref 2–14)
MYELOCYTES NFR BLD: 0.9 % — HIGH (ref 0–0)
NEUTROPHILS # BLD AUTO: 5.32 K/UL — SIGNIFICANT CHANGE UP (ref 1.8–7.4)
NEUTROPHILS NFR BLD AUTO: 50.9 % — SIGNIFICANT CHANGE UP (ref 43–77)
OVALOCYTES BLD QL SMEAR: SIGNIFICANT CHANGE UP
PLAT MORPH BLD: NORMAL — SIGNIFICANT CHANGE UP
PLATELET # BLD AUTO: 261 K/UL — SIGNIFICANT CHANGE UP (ref 150–400)
POIKILOCYTOSIS BLD QL AUTO: SIGNIFICANT CHANGE UP
POLYCHROMASIA BLD QL SMEAR: SIGNIFICANT CHANGE UP
POTASSIUM SERPL-MCNC: 3.8 MMOL/L — SIGNIFICANT CHANGE UP (ref 3.5–5.3)
POTASSIUM SERPL-SCNC: 3.8 MMOL/L — SIGNIFICANT CHANGE UP (ref 3.5–5.3)
PROT SERPL-MCNC: 8.1 G/DL — SIGNIFICANT CHANGE UP (ref 6.6–8.7)
RBC # BLD: 5.67 M/UL — SIGNIFICANT CHANGE UP (ref 4.2–5.8)
RBC # FLD: 20.1 % — HIGH (ref 10.3–14.5)
RBC BLD AUTO: ABNORMAL
SMUDGE CELLS # BLD: PRESENT — SIGNIFICANT CHANGE UP
SODIUM SERPL-SCNC: 142 MMOL/L — SIGNIFICANT CHANGE UP (ref 135–145)
STOMATOCYTES BLD QL SMEAR: SLIGHT — SIGNIFICANT CHANGE UP
TARGETS BLD QL SMEAR: SIGNIFICANT CHANGE UP
TROPONIN T SERPL-MCNC: <0.01 NG/ML — SIGNIFICANT CHANGE UP (ref 0–0.06)
VARIANT LYMPHS # BLD: 0.9 % — SIGNIFICANT CHANGE UP (ref 0–6)
WBC # BLD: 10.45 K/UL — SIGNIFICANT CHANGE UP (ref 3.8–10.5)
WBC # FLD AUTO: 10.45 K/UL — SIGNIFICANT CHANGE UP (ref 3.8–10.5)

## 2023-02-04 PROCEDURE — 85025 COMPLETE CBC W/AUTO DIFF WBC: CPT

## 2023-02-04 PROCEDURE — 84484 ASSAY OF TROPONIN QUANT: CPT

## 2023-02-04 PROCEDURE — 93010 ELECTROCARDIOGRAM REPORT: CPT

## 2023-02-04 PROCEDURE — 99285 EMERGENCY DEPT VISIT HI MDM: CPT

## 2023-02-04 PROCEDURE — 80053 COMPREHEN METABOLIC PANEL: CPT

## 2023-02-04 PROCEDURE — 93005 ELECTROCARDIOGRAM TRACING: CPT

## 2023-02-04 PROCEDURE — 71045 X-RAY EXAM CHEST 1 VIEW: CPT | Mod: 26

## 2023-02-04 PROCEDURE — 36415 COLL VENOUS BLD VENIPUNCTURE: CPT

## 2023-02-04 PROCEDURE — 71045 X-RAY EXAM CHEST 1 VIEW: CPT

## 2023-02-04 NOTE — ED STATDOCS - CLINICAL SUMMARY MEDICAL DECISION MAKING FREE TEXT BOX
Pt p/w [id number] with low risk chest pain heart score of 1, EKG is NSR and non-ischemic, only 1 troponin necessary given multiple days of pain and stable for outpatient cardiology f/u. Pt p/w [id number] with low risk chest pain heart score of 1, EKG is NSR and non-ischemic, only 1 troponin necessary given multiple days of pain and stable for outpatient cardiology f/u.      Pt with chest pain, has cardio f/u on 2/14/23, pt notes multiple days fo chest pain, microcytic anemia, non ischemic ekg, NSR at 88 bpm, chst xray clear, trop negative, Pt reassessed, pt feeling better at this time, vss, pt able to walk, talk and vocalized plan of action. Discussed in depth and explained to pt in depth the next steps that need to be taking including proper follow up with PCP or specialists. All incidental findings were discussed with pt as well. Pt verbalized their concerns and all questions were answered. Pt understands dispo and wants discharge. Given good instructions when to return to ED and importance of f/u. Pt p/w [id number] with low risk chest pain heart score of 1, EKG is NSR and non-ischemic, only 1 troponin necessary given multiple days of pain and stable for outpatient cardiology f/u.

## 2023-02-04 NOTE — ED ADULT TRIAGE NOTE - BP NONINVASIVE DIASTOLIC (MM HG)
99 Quality 226: Preventive Care And Screening: Tobacco Use: Screening And Cessation Intervention: Patient screened for tobacco use and is an ex/non-smoker Quality 111:Pneumonia Vaccination Status For Older Adults: Pneumococcal Vaccination not Administered or Previously Received, Reason not Otherwise Specified Detail Level: Detailed Quality 47: Advance Care Plan: Advance Care Planning discussed and documented; advance care plan or surrogate decision maker documented in the medical record. Quality 110: Preventive Care And Screening: Influenza Immunization: Influenza Immunization not Administered because Patient Refused.

## 2023-02-04 NOTE — ED ADULT TRIAGE NOTE - CHIEF COMPLAINT QUOTE
pt c/o left side chest pain x 2 days, + SOB, non smoker, pain has been on & off  A&Ox3, resp no distres

## 2023-02-04 NOTE — ED STATDOCS - ATTENDING APP SHARED VISIT CONTRIBUTION OF CARE
I, Fred Downing, performed the initial face to face bedside interview with this patient regarding history of present illness, review of symptoms and relevant past medical, social and family history.  I completed an independent physical examination.  I was the initial provider who evaluated this patient. I have signed out the follow up of any pending tests (i.e. labs, radiological studies) to the ACP.  I have communicated the patient’s plan of care and disposition with the ACP.

## 2023-02-04 NOTE — ED STATDOCS - PATIENT PORTAL LINK FT
You can access the FollowMyHealth Patient Portal offered by [facility] by registering at the following website: [email or web address]. By joining [facility]’s FollowMyHealth portal, you will also be able to view your health information using other applications (apps) compatible with our system.

## 2023-02-04 NOTE — ED STATDOCS - OBJECTIVE STATEMENT
33 y/o male with PMHx of HTN, asthma presents to the ED c/o 2 days of non-radiating, non-exertional, intermittent, sharp, burning chest pain that occurs in episodes that last a few minutes associated with occasional SOB. Pt denies calf swelling or pain. Pt is non-smoker, denies family cardiac hx.

## 2023-02-04 NOTE — ED ADULT NURSE NOTE - OBJECTIVE STATEMENT
Alert and oriented x4. Pt presenting to ED from home complaining of chest pain x2 days. Pt states the pain comes and goes and feels like a "stabbing pain". Denies radiation to anywhere else. Pt reports pain to be mild at time of assessment. Denies shortness of breath, fever, chills, N/V/D. PMH ASTHMA, HTN. Respirations equal/unlabored. IV placed, labs drawn and sent. Family present at bedside. Alert and oriented x4. Pt presenting to ED from home complaining of chest pain x2 days. Pt states the pain comes and goes and feels like a "stabbing pain". Denies radiation to anywhere else. Pt reports pain to be mild at time of assessment. Denies shortness of breath, fever, chills, N/V/D. PMH ASTHMA, HTN. Respirations equal/unlabored. Placed on cardiac monitor NSR. IV placed, labs drawn and sent. Family present at bedside.

## 2023-02-06 RX ORDER — BUDESONIDE, GLYCOPYRROLATE, AND FORMOTEROL FUMARATE 160; 9; 4.8 UG/1; UG/1; UG/1
160-9-4.8 AEROSOL, METERED RESPIRATORY (INHALATION) TWICE DAILY
Qty: 3 | Refills: 1 | Status: DISCONTINUED | COMMUNITY
Start: 2023-02-02 | End: 2023-02-06

## 2023-02-14 ENCOUNTER — APPOINTMENT (OUTPATIENT)
Dept: CARDIOLOGY | Facility: CLINIC | Age: 33
End: 2023-02-14

## 2023-02-14 DIAGNOSIS — Z87.898 PERSONAL HISTORY OF OTHER SPECIFIED CONDITIONS: ICD-10-CM

## 2023-02-14 DIAGNOSIS — Z86.79 PERSONAL HISTORY OF OTHER DISEASES OF THE CIRCULATORY SYSTEM: ICD-10-CM

## 2023-02-14 DIAGNOSIS — U07.1 COVID-19: ICD-10-CM

## 2023-02-15 PROBLEM — I10 ESSENTIAL (PRIMARY) HYPERTENSION: Chronic | Status: ACTIVE | Noted: 2023-02-04

## 2023-02-18 ENCOUNTER — LABORATORY RESULT (OUTPATIENT)
Age: 33
End: 2023-02-18

## 2023-02-21 LAB
A ALTERNATA IGE QN: 1.11 KUA/L
A FUMIGATUS IGE QN: 0.73 KUA/L
BASOPHILS # BLD AUTO: 0.07 K/UL
BASOPHILS NFR BLD AUTO: 0.7 %
C ALBICANS IGE QN: 8.83 KUA/L
C HERBARUM IGE QN: 0.44 KUA/L
CAT DANDER IGE QN: 0.48 KUA/L
COMMON RAGWEED IGE QN: 10.4 KUA/L
D FARINAE IGE QN: 39.8 KUA/L
D PTERONYSS IGE QN: 25.3 KUA/L
DEPRECATED A ALTERNATA IGE RAST QL: 2
DEPRECATED A FUMIGATUS IGE RAST QL: 2
DEPRECATED C ALBICANS IGE RAST QL: 3
DEPRECATED C HERBARUM IGE RAST QL: 1
DEPRECATED CAT DANDER IGE RAST QL: 1
DEPRECATED COMMON RAGWEED IGE RAST QL: 3
DEPRECATED D FARINAE IGE RAST QL: 4
DEPRECATED D PTERONYSS IGE RAST QL: 4
DEPRECATED DOG DANDER IGE RAST QL: 2
DEPRECATED M RACEMOSUS IGE RAST QL: 2
DEPRECATED ROACH IGE RAST QL: 5
DEPRECATED TIMOTHY IGE RAST QL: 3
DEPRECATED WHITE OAK IGE RAST QL: 3
DOG DANDER IGE QN: 2.12 KUA/L
EOSINOPHIL # BLD AUTO: 0.32 K/UL
EOSINOPHIL # BLD MANUAL: 320 /UL
EOSINOPHIL NFR BLD AUTO: 3.1 %
HCT VFR BLD CALC: 38.8 %
HGB BLD-MCNC: 11.4 G/DL
IMM GRANULOCYTES NFR BLD AUTO: 0.3 %
LYMPHOCYTES # BLD AUTO: 4.06 K/UL
LYMPHOCYTES NFR BLD AUTO: 39.1 %
M RACEMOSUS IGE QN: 0.86 KUA/L
MAN DIFF?: NORMAL
MCHC RBC-ENTMCNC: 19.4 PG
MCHC RBC-ENTMCNC: 29.4 GM/DL
MCV RBC AUTO: 65.9 FL
MONOCYTES # BLD AUTO: 0.79 K/UL
MONOCYTES NFR BLD AUTO: 7.6 %
NEUTROPHILS # BLD AUTO: 5.12 K/UL
NEUTROPHILS NFR BLD AUTO: 49.2 %
PLATELET # BLD AUTO: 236 K/UL
RBC # BLD: 5.89 M/UL
RBC # FLD: 20.5 %
ROACH IGE QN: 64.1 KUA/L
TIMOTHY IGE QN: 5.84 KUA/L
WBC # FLD AUTO: 10.39 K/UL
WHITE OAK IGE QN: 4.64 KUA/L

## 2023-02-26 ENCOUNTER — EMERGENCY (EMERGENCY)
Facility: HOSPITAL | Age: 33
LOS: 1 days | Discharge: HOME | End: 2023-02-26
Attending: EMERGENCY MEDICINE
Payer: COMMERCIAL

## 2023-02-26 VITALS
DIASTOLIC BLOOD PRESSURE: 98 MMHG | HEART RATE: 90 BPM | RESPIRATION RATE: 18 BRPM | TEMPERATURE: 98 F | SYSTOLIC BLOOD PRESSURE: 163 MMHG | HEIGHT: 71 IN | OXYGEN SATURATION: 98 % | WEIGHT: 220.02 LBS

## 2023-02-26 PROCEDURE — 99284 EMERGENCY DEPT VISIT MOD MDM: CPT | Mod: 25

## 2023-02-26 PROCEDURE — 71045 X-RAY EXAM CHEST 1 VIEW: CPT | Mod: 26

## 2023-02-26 PROCEDURE — 99284 EMERGENCY DEPT VISIT MOD MDM: CPT

## 2023-02-26 PROCEDURE — 94640 AIRWAY INHALATION TREATMENT: CPT

## 2023-02-26 PROCEDURE — 71045 X-RAY EXAM CHEST 1 VIEW: CPT

## 2023-02-26 PROCEDURE — 96374 THER/PROPH/DIAG INJ IV PUSH: CPT

## 2023-02-26 PROCEDURE — 96375 TX/PRO/DX INJ NEW DRUG ADDON: CPT

## 2023-02-26 RX ORDER — IPRATROPIUM/ALBUTEROL SULFATE 18-103MCG
3 AEROSOL WITH ADAPTER (GRAM) INHALATION ONCE
Refills: 0 | Status: COMPLETED | OUTPATIENT
Start: 2023-02-26 | End: 2023-02-26

## 2023-02-26 RX ORDER — MAGNESIUM SULFATE 500 MG/ML
2 VIAL (ML) INJECTION ONCE
Refills: 0 | Status: COMPLETED | OUTPATIENT
Start: 2023-02-26 | End: 2023-02-26

## 2023-02-26 RX ADMIN — Medication 150 GRAM(S): at 06:55

## 2023-02-26 RX ADMIN — Medication 125 MILLIGRAM(S): at 06:55

## 2023-02-26 RX ADMIN — Medication 3 MILLILITER(S): at 06:56

## 2023-02-26 NOTE — ED ADULT TRIAGE NOTE - CHIEF COMPLAINT QUOTE
BIBEMS c/o asthma exacerbation beginning last PM. Patient states he had used his  rescue inhaler and multiple nebulizer treatments without relief. Patient denies CP/discomfort, RR even and unlabored.

## 2023-02-26 NOTE — ED PROVIDER NOTE - PATIENT PORTAL LINK FT
You can access the FollowMyHealth Patient Portal offered by [facility] by registering at the following website: [email or web address]. By joining [facility]’s FollowMyHealth portal, you will also be able to view your health information using other applications (apps) compatible with our system.

## 2023-02-26 NOTE — ED PROVIDER NOTE - CLINICAL SUMMARY MEDICAL DECISION MAKING FREE TEXT BOX
Pt arrived with mild asthma exacerbatyion, in no resp distress, no hypoxia. Asthma was treated with nebs and steroids with clinical improvement, no sign of infiltrate on cxr, pt is stable for dc

## 2023-02-26 NOTE — ED PROVIDER NOTE - CPE EDP NEURO NORM
Comment: 2019 - right proximal pretibia - excision Detail Level: Simple Comment: 2019 - left medial chest - excision normal...

## 2023-02-26 NOTE — ED PROVIDER NOTE - OBJECTIVE STATEMENT
32 year old male with PMH asthma presents with asthma exacerbation. Pt states that his asthma began to worsen yesterday and he use dhis neb 4 times yesterday. He states that the breathing was worse this am, so he came to the ED. He reports wheezing, chest tightness, SOB. no fevers, chills, LE edema, chest pain.

## 2023-02-26 NOTE — ED ADULT TRIAGE NOTE - TEMPERATURE IN CELSIUS (DEGREES C)
Paperwork noting that patient may bear financial responsibility for procedure(s) performed in clinic today signed prior to proceeding with procedure(s).     Furthermore, patient notified that they should contact their insurer to verify that your procedure/t 36.7

## 2023-03-01 ENCOUNTER — APPOINTMENT (OUTPATIENT)
Dept: CARDIOLOGY | Facility: CLINIC | Age: 33
End: 2023-03-01

## 2023-03-01 RX ORDER — PREDNISONE 20 MG/1
20 TABLET ORAL DAILY
Refills: 0 | Status: ACTIVE | COMMUNITY

## 2023-03-22 NOTE — HISTORY OF PRESENT ILLNESS
[FreeTextEntry1] : 32M h/o morbid obesity (BMI 47), PAWEL, asthma recently with recurrent ER visits at [facility] x3 most recently on 2/26/23 for asthma exacerbation, CXR x2 negative, EKG normal, reports suspected history of hypertrophic cardiomyopathy, presents for cardiology evaluation.

## 2023-03-23 ENCOUNTER — APPOINTMENT (OUTPATIENT)
Dept: CARDIOLOGY | Facility: CLINIC | Age: 33
End: 2023-03-23

## 2023-03-28 NOTE — HISTORY OF PRESENT ILLNESS
[TextBox_4] : Morbidly obese male\par Never a smoker\par Mild intermittent asthma - worse lately\par Exac > 3X/year\par Steroid 3X/year\par No asa intol or nasal polyps\par No intubation \par PAWEL\par Hypertrophic cardiomyopathy suspect \par

## 2023-03-28 NOTE — DISCUSSION/SUMMARY
[FreeTextEntry1] : Assess\par \par Morbid obesity\par GERD\par Asthma - moderate persistent - R/O allergic\par PAWEL\par \par Plan\par \par Healthy Weight loss referral\par Truncal elevation of 30 degrees for sleep.  Avoid eating within 90 minutes of the hour of sleep.\par WP HST\par Breztri via spacer\par Albuterol via neb Rescue\par One month FU with alba \par Asthma/ allergy labs

## 2023-03-31 ENCOUNTER — APPOINTMENT (OUTPATIENT)
Dept: PULMONOLOGY | Facility: CLINIC | Age: 33
End: 2023-03-31

## 2023-04-19 NOTE — HISTORY OF PRESENT ILLNESS
[FreeTextEntry1] : 32M h/o morbid obesity (BMI 47), PAWEL, asthma, HTN, recently with recurrent ER visits at [facility] x3 most recently on 2/26/23 for asthma exacerbation, CXR x2 negative, EKG normal, reports suspected history of hypertrophic cardiomyopathy, presents for cardiology evaluation.

## 2023-04-20 ENCOUNTER — APPOINTMENT (OUTPATIENT)
Dept: CARDIOLOGY | Facility: CLINIC | Age: 33
End: 2023-04-20

## 2023-06-03 NOTE — ED PROVIDER NOTE - CPE EDP GASTRO NORM
Pt states that she has recently had a tetanus shot at [facility] in [place] on 04/15/2023.      Shilpa Lee RN  06/02/23 9413 normal...

## 2023-06-07 ENCOUNTER — APPOINTMENT (OUTPATIENT)
Dept: CARDIOLOGY | Facility: CLINIC | Age: 33
End: 2023-06-07

## 2023-06-09 ENCOUNTER — EMERGENCY (EMERGENCY)
Facility: HOSPITAL | Age: 33
LOS: 1 days | Discharge: HOME | End: 2023-06-09
Attending: EMERGENCY MEDICINE
Payer: COMMERCIAL

## 2023-06-09 VITALS
HEIGHT: 67 IN | RESPIRATION RATE: 22 BRPM | OXYGEN SATURATION: 94 % | TEMPERATURE: 98 F | HEART RATE: 112 BPM | SYSTOLIC BLOOD PRESSURE: 145 MMHG | DIASTOLIC BLOOD PRESSURE: 110 MMHG | WEIGHT: 315 LBS

## 2023-06-09 VITALS
TEMPERATURE: 98 F | SYSTOLIC BLOOD PRESSURE: 198 MMHG | RESPIRATION RATE: 20 BRPM | OXYGEN SATURATION: 97 % | DIASTOLIC BLOOD PRESSURE: 78 MMHG | HEART RATE: 102 BPM

## 2023-06-09 PROCEDURE — 94640 AIRWAY INHALATION TREATMENT: CPT

## 2023-06-09 PROCEDURE — 93005 ELECTROCARDIOGRAM TRACING: CPT

## 2023-06-09 PROCEDURE — 71045 X-RAY EXAM CHEST 1 VIEW: CPT

## 2023-06-09 PROCEDURE — 99283 EMERGENCY DEPT VISIT LOW MDM: CPT | Mod: 25

## 2023-06-09 PROCEDURE — 93010 ELECTROCARDIOGRAM REPORT: CPT

## 2023-06-09 PROCEDURE — 99284 EMERGENCY DEPT VISIT MOD MDM: CPT

## 2023-06-09 PROCEDURE — 71045 X-RAY EXAM CHEST 1 VIEW: CPT | Mod: 26

## 2023-06-09 RX ORDER — IPRATROPIUM/ALBUTEROL SULFATE 18-103MCG
3 AEROSOL WITH ADAPTER (GRAM) INHALATION ONCE
Refills: 0 | Status: COMPLETED | OUTPATIENT
Start: 2023-06-09 | End: 2023-06-09

## 2023-06-09 RX ORDER — ACETAMINOPHEN 500 MG
650 TABLET ORAL ONCE
Refills: 0 | Status: COMPLETED | OUTPATIENT
Start: 2023-06-09 | End: 2023-06-09

## 2023-06-09 RX ORDER — FLUTICASONE PROPIONATE 50 MCG
1 SPRAY, SUSPENSION NASAL
Qty: 1 | Refills: 0
Start: 2023-06-09 | End: 2023-06-15

## 2023-06-09 RX ORDER — FLUTICASONE FUROATE, UMECLIDINIUM BROMIDE AND VILANTEROL TRIFENATATE 200; 62.5; 25 UG/1; UG/1; UG/1
1 POWDER RESPIRATORY (INHALATION)
Qty: 1 | Refills: 0
Start: 2023-06-09

## 2023-06-09 RX ADMIN — Medication 650 MILLIGRAM(S): at 19:40

## 2023-06-09 RX ADMIN — Medication 3 MILLILITER(S): at 18:21

## 2023-06-09 NOTE — ED PROVIDER NOTE - CARE PROVIDER_API CALL
Yaniv Ruano  Internal Medicine  [address] [place] [address], [address] [place] [id number]  Phone: [phone]  Fax: [phone]  Follow Up Time: 4-6 Days

## 2023-06-09 NOTE — ED ADULT NURSE NOTE - NSFALLUNIVINTERV_ED_ALL_ED
Bed/Stretcher in lowest position, wheels locked, appropriate side rails in place/Call bell, personal items and telephone in reach/Instruct patient to call for assistance before getting out of bed/chair/stretcher/Non-slip footwear applied when patient is off stretcher/[place] to call system/Physically safe environment - no spills, clutter or unnecessary equipment/Purposeful proactive rounding/Room/bathroom lighting operational, light cord in reach

## 2023-06-09 NOTE — ED PROVIDER NOTE - PATIENT PORTAL LINK FT
You can access the FollowMyHealth Patient Portal offered by [facility] by registering at the following website: [email or web address]. By joining [facility]’s FollowMyHealth portal, you will also be able to view your health information using other applications (apps) compatible with our system.

## 2023-06-09 NOTE — ED PROVIDER NOTE - PHYSICAL EXAMINATION
Gen: No acute distress, non toxic  HEENT: Mucous membranes moist, pink conjunctivae, EOMI  CV: tachy ~105, nl s1/s2.  Resp: speaking full sentences, faint expiratory wheeze, moving air. no retractions. satting 96% on room air  GI: Abdomen soft, NT, ND. No rebound, no guarding  : No CVAT  Neuro: A&O x 3, moving all 4 extremities  MSK: No spine or joint tenderness to palpation. no swelling b/l LE  Skin: No rashes. intact and perfused.

## 2023-06-09 NOTE — ED ADULT TRIAGE NOTE - CHIEF COMPLAINT QUOTE
" I started to feel out of breath and like I had an asthma exacerbation" pt received 0.3mg epi IM, 10mg dexamethasone IM and 3 duoneb treatments. pt states he feels better after meds

## 2023-06-09 NOTE — ED PROVIDER NOTE - OBJECTIVE STATEMENT
31 y/o male hx htn, asthma ( no prior hospitalizations) c/o his typical asthma attack starting earlire today after being outside. thinks the air quality recently may have contributed. received 3 duonebs, epi IM, and dex in route and feels significant improvement. states was on trelegy but pcp gave him advair when he ran out and hasn't seen his pulm recently. thinks trelegy works better. no fever. no other allergic exposures, no other complaints

## 2023-06-09 NOTE — ED PROVIDER NOTE - CLINICAL SUMMARY MEDICAL DECISION MAKING FREE TEXT BOX
31 y/o male hx asthma c/o acute asthma exacerbation symptoms with sob/wheezing. improved after im epi, dex and duonebs from ems. states almost completely back to normal. satting 96%, no resp distress. will get cxr, one duoneb, prescribe trelegy. close f/. return precautions. 31 y/o male hx asthma c/o acute asthma exacerbation symptoms with sob/wheezing. improved after im epi, dex and duonebs from ems. states almost completely back to normal. satting 96%, no resp distress. will get cxr, one duoneb, prescribe trelegy. close f/. return precautions.    pt feels much better, breathing comfortable, nl sat and lungs moving good air. requesting flonase. steroids, trelegy. return precauitons. pulm f/u.

## 2023-06-09 NOTE — ED ADULT NURSE NOTE - OBJECTIVE STATEMENT
Pt received A&Ox4 c/o SOB and inspiratory chest tightness. HX of asthma. Pt states symptoms started approx 1 hr ago while wife was cooking. Pt used inhaler and one round of nebulizer tx with no relief. As per wife, pt also received another neb while in ambulance. 20g IV present to RAC by EMS, + flush, + blood return. Pt on CM with . VSS. Respirations even & unlabored. NAD. Pt and daughter made aware of plan of care and verbalized understanding.

## 2023-06-09 NOTE — ED ADULT NURSE REASSESSMENT NOTE - NS ED NURSE REASSESS COMMENT FT1
assumed pt care at 1920- pt awake, alert and oriented, speaking in full sentences. pt is 98% on room air. reports headache 7/10 and requesting tylenol, Dr. Plata made aware,.

## 2023-07-06 ENCOUNTER — RX RENEWAL (OUTPATIENT)
Age: 33
End: 2023-07-06

## 2023-07-06 RX ORDER — FLUTICASONE FUROATE, UMECLIDINIUM BROMIDE AND VILANTEROL TRIFENATATE 100; 62.5; 25 UG/1; UG/1; UG/1
100-62.5-25 POWDER RESPIRATORY (INHALATION) DAILY
Qty: 180 | Refills: 0 | Status: ACTIVE | COMMUNITY
Start: 2023-02-06 | End: 1900-01-01

## 2023-08-09 ENCOUNTER — EMERGENCY (EMERGENCY)
Facility: HOSPITAL | Age: 33
LOS: 1 days | Discharge: LEFT BEFORE DISCHARGE | End: 2023-08-09
Attending: EMERGENCY MEDICINE
Payer: COMMERCIAL

## 2023-08-09 VITALS
WEIGHT: 315 LBS | OXYGEN SATURATION: 98 % | DIASTOLIC BLOOD PRESSURE: 88 MMHG | HEART RATE: 75 BPM | HEIGHT: 71 IN | SYSTOLIC BLOOD PRESSURE: 185 MMHG | TEMPERATURE: 98 F | RESPIRATION RATE: 20 BRPM

## 2023-08-09 VITALS
RESPIRATION RATE: 18 BRPM | DIASTOLIC BLOOD PRESSURE: 80 MMHG | SYSTOLIC BLOOD PRESSURE: 162 MMHG | HEART RATE: 67 BPM | OXYGEN SATURATION: 97 % | TEMPERATURE: 98 F

## 2023-08-09 LAB
ALBUMIN SERPL ELPH-MCNC: 4.2 G/DL — SIGNIFICANT CHANGE UP (ref 3.3–5.2)
ALP SERPL-CCNC: 64 U/L — SIGNIFICANT CHANGE UP (ref 40–120)
ALT FLD-CCNC: 38 U/L — SIGNIFICANT CHANGE UP
ANION GAP SERPL CALC-SCNC: 12 MMOL/L — SIGNIFICANT CHANGE UP (ref 5–17)
ANISOCYTOSIS BLD QL: SIGNIFICANT CHANGE UP
AST SERPL-CCNC: 17 U/L — SIGNIFICANT CHANGE UP
BASOPHILS # BLD AUTO: 0.05 K/UL — SIGNIFICANT CHANGE UP (ref 0–0.2)
BASOPHILS NFR BLD AUTO: 0.4 % — SIGNIFICANT CHANGE UP (ref 0–2)
BILIRUB SERPL-MCNC: 0.8 MG/DL — SIGNIFICANT CHANGE UP (ref 0.4–2)
BUN SERPL-MCNC: 10.2 MG/DL — SIGNIFICANT CHANGE UP (ref 8–20)
CALCIUM SERPL-MCNC: 9.3 MG/DL — SIGNIFICANT CHANGE UP (ref 8.4–10.5)
CHLORIDE SERPL-SCNC: 98 MMOL/L — SIGNIFICANT CHANGE UP (ref 96–108)
CO2 SERPL-SCNC: 29 MMOL/L — SIGNIFICANT CHANGE UP (ref 22–29)
CREAT SERPL-MCNC: 0.69 MG/DL — SIGNIFICANT CHANGE UP (ref 0.5–1.3)
DACRYOCYTES BLD QL SMEAR: SLIGHT — SIGNIFICANT CHANGE UP
EGFR: 127 ML/MIN/1.73M2 — SIGNIFICANT CHANGE UP
ELLIPTOCYTES BLD QL SMEAR: SLIGHT — SIGNIFICANT CHANGE UP
EOSINOPHIL # BLD AUTO: 0.09 K/UL — SIGNIFICANT CHANGE UP (ref 0–0.5)
EOSINOPHIL NFR BLD AUTO: 0.7 % — SIGNIFICANT CHANGE UP (ref 0–6)
GLUCOSE SERPL-MCNC: 103 MG/DL — HIGH (ref 70–99)
HCT VFR BLD CALC: 36.1 % — LOW (ref 39–50)
HGB BLD-MCNC: 10.9 G/DL — LOW (ref 13–17)
HYPOCHROMIA BLD QL: SLIGHT — SIGNIFICANT CHANGE UP
IMM GRANULOCYTES NFR BLD AUTO: 0.7 % — SIGNIFICANT CHANGE UP (ref 0–0.9)
LYMPHOCYTES # BLD AUTO: 27.1 % — SIGNIFICANT CHANGE UP (ref 13–44)
LYMPHOCYTES # BLD AUTO: 3.29 K/UL — SIGNIFICANT CHANGE UP (ref 1–3.3)
MANUAL SMEAR VERIFICATION: SIGNIFICANT CHANGE UP
MCHC RBC-ENTMCNC: 19.6 PG — LOW (ref 27–34)
MCHC RBC-ENTMCNC: 30.2 GM/DL — LOW (ref 32–36)
MCV RBC AUTO: 65 FL — LOW (ref 80–100)
MICROCYTES BLD QL: SIGNIFICANT CHANGE UP
MONOCYTES # BLD AUTO: 0.86 K/UL — SIGNIFICANT CHANGE UP (ref 0–0.9)
MONOCYTES NFR BLD AUTO: 7.1 % — SIGNIFICANT CHANGE UP (ref 2–14)
NEUTROPHILS # BLD AUTO: 7.75 K/UL — HIGH (ref 1.8–7.4)
NEUTROPHILS NFR BLD AUTO: 64 % — SIGNIFICANT CHANGE UP (ref 43–77)
OVALOCYTES BLD QL SMEAR: SLIGHT — SIGNIFICANT CHANGE UP
PLAT MORPH BLD: NORMAL — SIGNIFICANT CHANGE UP
PLATELET # BLD AUTO: 228 K/UL — SIGNIFICANT CHANGE UP (ref 150–400)
POIKILOCYTOSIS BLD QL AUTO: SLIGHT — SIGNIFICANT CHANGE UP
POLYCHROMASIA BLD QL SMEAR: SLIGHT — SIGNIFICANT CHANGE UP
POTASSIUM SERPL-MCNC: 3.7 MMOL/L — SIGNIFICANT CHANGE UP (ref 3.5–5.3)
POTASSIUM SERPL-SCNC: 3.7 MMOL/L — SIGNIFICANT CHANGE UP (ref 3.5–5.3)
PROT SERPL-MCNC: 7.6 G/DL — SIGNIFICANT CHANGE UP (ref 6.6–8.7)
RBC # BLD: 5.55 M/UL — SIGNIFICANT CHANGE UP (ref 4.2–5.8)
RBC # FLD: 18.8 % — HIGH (ref 10.3–14.5)
RBC BLD AUTO: ABNORMAL
SODIUM SERPL-SCNC: 138 MMOL/L — SIGNIFICANT CHANGE UP (ref 135–145)
WBC # BLD: 12.12 K/UL — HIGH (ref 3.8–10.5)
WBC # FLD AUTO: 12.12 K/UL — HIGH (ref 3.8–10.5)

## 2023-08-09 PROCEDURE — 96374 THER/PROPH/DIAG INJ IV PUSH: CPT

## 2023-08-09 PROCEDURE — 99285 EMERGENCY DEPT VISIT HI MDM: CPT

## 2023-08-09 PROCEDURE — 85025 COMPLETE CBC W/AUTO DIFF WBC: CPT

## 2023-08-09 PROCEDURE — 99284 EMERGENCY DEPT VISIT MOD MDM: CPT | Mod: 25

## 2023-08-09 PROCEDURE — 36415 COLL VENOUS BLD VENIPUNCTURE: CPT

## 2023-08-09 PROCEDURE — 80053 COMPREHEN METABOLIC PANEL: CPT

## 2023-08-09 RX ORDER — FLUTICASONE PROPIONATE 50 MCG
1 SPRAY, SUSPENSION NASAL
Qty: 1 | Refills: 0
Start: 2023-08-09 | End: 2023-08-15

## 2023-08-09 RX ORDER — METOCLOPRAMIDE HCL 10 MG
10 TABLET ORAL ONCE
Refills: 0 | Status: COMPLETED | OUTPATIENT
Start: 2023-08-09 | End: 2023-08-09

## 2023-08-09 RX ORDER — CETIRIZINE HYDROCHLORIDE 10 MG/1
1 TABLET ORAL
Qty: 7 | Refills: 0
Start: 2023-08-09 | End: 2023-08-15

## 2023-08-09 RX ADMIN — Medication 1 TABLET(S): at 06:11

## 2023-08-09 RX ADMIN — Medication 10 MILLIGRAM(S): at 07:31

## 2023-08-09 NOTE — ED PROVIDER NOTE - PHYSICAL EXAMINATION
General: non-toxic appearing, in no acute distress, A+Ox3  HENT: normocephalic.  Nasal mucosa non-inflamed, septum and turbinates normal. Mucous membranes moist, no gingival inflammation, no tonsillar hypertrophy or exudates, uvula midline. Neck supple without bruits or adenopathy   Eyes: pink conjunctivae, EOMI, PERRLA  CV: RRR, nl s1/s2.  Resp: CTAB, normal rate and effort  GI: Abdomen soft, NT  Neuro: CN II-XII intact, sensorimotor intact without deficits   Skin: No rashes, intact and perfused.

## 2023-08-09 NOTE — ED ADULT TRIAGE NOTE - CHIEF COMPLAINT QUOTE
pt arrived to walk in triage with SOB, HEAdache and elevated BP wife states they went to [facility] due to SOB and was  told BP was elevated. pt states he was given meds for his asthma and nothing for his BP and was told if symptoms become worse to come to Emergency Department was told to go home a rest.

## 2023-08-09 NOTE — ED ADULT NURSE NOTE - OBJECTIVE STATEMENT
Assumed pt care 0500. A&Ox4. PT came from [facility] due to elevated blood pressure. Pt wife at bedside. PT states he has a history of HTN but does not follow up with his meds. Pt states "I haven't taken my medication for 4 weeks I just don't like to take medications." PT states he feels SOB and dizzy when his blood pressure gets elevated. Pt denies any CP, HA, dizziness, vision changes, abd pain, NVD, fever/chills. Respirations even & unlabored. NAD. Pt made aware of plan of care and verbalized understanding.

## 2023-08-09 NOTE — ED ADULT NURSE NOTE - DRUG PRE-SCREENING (DAST -1)
ED Time Seen By Provider Entered On:  10/4/2020 19:03     Performed On:  10/4/2020 19:03  by Nathaniel Person MD               Time Seen By Provider   Time Seen by Provider :   10/4/2020 19:03    Nathaniel Person MD - 10/4/2020 19:03                Electronically Signed On 10.04.2020 19:03  ___________________________________________________   Nathaniel Person MD     Statement Selected

## 2023-08-09 NOTE — ED PROVIDER NOTE - ATTENDING APP SHARED VISIT CONTRIBUTION OF CARE
33yo M p/w SOB, HA, and blurry vision. on eval, well appearing, neuro intact, ambulating with steady gait, lungs clear, no resp distress or increased work of breathing, remainder of PE WNL. VSS, afebrile, /88. visual acuity 20/40 OS, 20/20 OD- pt reports having prescription glasses but does not use them. ordered Fioricet, reglan, labs- will reassess. sent flonase and zyrtec to pharmacy for allergic sx. pt has apt with pulm next month. advised to f.u with PCP regarding HTN. discussed importance of HTN med compliance. discussed supportive care measures and return precautions. pt verbalized understanding and agreement

## 2023-08-09 NOTE — ED PROVIDER NOTE - CLINICAL SUMMARY MEDICAL DECISION MAKING FREE TEXT BOX
31yo M p/w SOB, HA, and blurry vision. on eval, well appearing, neuro intact, ambulating with steady gait, lungs clear, no resp distress or increased work of breathing, remainder of PE WNL. VSS, afebrile, /88. visual acuity 20/40 OS, 20/20 OD- pt reports having prescription glasses but does not use them. ordered Fioricet- will reassess. sent flonase and zyrtec to pharmacy for allergic sx. pt has apt with pulm next month. advised to f.u with PCP regarding HTN. discussed importance of HTN med compliance. discussed supportive care measures and return precautions. pt verbalized understanding and agreement 31yo M p/w SOB, HA, and blurry vision. on eval, well appearing, neuro intact, ambulating with steady gait, lungs clear, no resp distress or increased work of breathing, remainder of PE WNL. VSS, afebrile, /88. visual acuity 20/40 OS, 20/20 OD- pt reports having prescription glasses but does not use them. ordered Fioricet, reglan, labs- will reassess. sent flonase and zyrtec to pharmacy for allergic sx. pt has apt with pulm next month. advised to f.u with PCP regarding HTN. discussed importance of HTN med compliance. discussed supportive care measures and return precautions. pt verbalized understanding and agreement done

## 2023-08-09 NOTE — ED ADULT NURSE NOTE - CHIEF COMPLAINT QUOTE
pt arrived to walk in triage with SOB, HEAdache and elevated BP wife states they went to [facility] due to SOB and was  told BP was elevated. pt states he was given meds for his asthma and nothing for his BP and was told if symptoms become worse to come to Emergency Department was told to go home a rest.

## 2023-08-09 NOTE — ED PROVIDER NOTE - OBJECTIVE STATEMENT
33yo M pmh HTN, allergies, asthma presents to ED c/o intermittent SOB and HA x1d. pt went to  yesterday and was rx spireva inhaler for asthma exacerbation. provider noted that BP was elevated 188/100 in office and advised pt to go to ER if sx worsened. pt reports no change in sx but concerned that sx may be related to elevated BP. pt also reports blurry vision. pt admits to recent diet where only eating watermelon. pt rx amlodipine 10mg but non-compliant. pt reports BP at home normally 140s/80's. at time of initial eval, pt reports blurred vision which improved during interview and reported SOB for few min while sitting in stretcher which resolved before interview. denies fever, cough, nasal congestion, sick contacts, abdominal pain, NV, lightheadedness/dizziness, CP, palpitations, pedal edema, recent travel

## 2023-08-09 NOTE — ED ADULT NURSE REASSESSMENT NOTE - NS ED NURSE REASSESS COMMENT FT1
Assumed care of pt from previous RN. Pt awake, NAD. Breathing even and unlabored. Offering no complaints at this time.

## 2023-08-09 NOTE — ED PROVIDER NOTE - PROGRESS NOTE DETAILS
pt still c/o pain after Fioricet. ordered labs and reglan. will reassess received signout from Dr. Britton.  patient eloped prior to discharge.

## 2023-10-05 NOTE — ED ADULT TRIAGE NOTE - LOCATION:
Patient Education   Personalized Prevention Plan  You are due for the preventive services outlined below.  Your care team is available to assist you in scheduling these services.  If you have already completed any of these items, please share that information with your care team to update in your medical record.  Health Maintenance Due   Topic Date Due     Heart Failure Action Plan  Never done     COPD Action Plan  Never done     Zoster (Shingles) Vaccine (2 of 3) 07/22/2008     Diptheria Tetanus Pertussis (DTAP/TDAP/TD) Vaccine (2 - Td or Tdap) 10/05/2022     Colorectal Cancer Screening  06/29/2023     Annual Wellness Visit  08/03/2023     Flu Vaccine (1) 09/01/2023     COVID-19 Vaccine (6 - 2023-24 season) 09/01/2023     Your Health Risk Assessment indicates you feel you are not in good health    A healthy lifestyle helps keep the body fit and the mind alert. It helps protect you from disease, helps you fight disease, and helps prevent chronic disease (disease that doesn't go away) from getting worse. This is important as you get older and begin to notice twinges in muscles and joints and a decline in the strength and stamina you once took for granted. A healthy lifestyle includes good healthcare, good nutrition, weight control, recreation, and regular exercise. Avoid harmful substances and do what you can to keep safe. Another part of a healthy lifestyle is stay mentally active and socially involved.    Good healthcare   Have a wellness visit every year.   If you have new symptoms, let us know right away. Don't wait until the next checkup.   Take medicines exactly as prescribed and keep your medicines in a safe place. Tell us if your medicine causes problems.   Healthy diet and weight control   Eat 3 or 4 small, nutritious, low-fat, high-fiber meals a day. Include a variety of fruits, vegetables, and whole-grain foods.   Make sure you get enough calcium in your diet. Calcium, vitamin D, and exercise help prevent  osteoporosis (bone thinning).   If you live alone, try eating with others when you can. That way you get a good meal and have company while you eat it.   Try to keep a healthy weight. If you eat more calories than your body uses for energy, it will be stored as fat and you will gain weight.     Recreation   Recreation is not limited to sports and team events. It includes any activity that provides relaxation, interest, enjoyment, and exercise. Recreation provides an outlet for physical, mental, and social energy. It can give a sense of worth and achievement. It can help you stay healthy.    Mental Exercise and Social Involvement  Mental and emotional health is as important as physical health. Keep in touch with friends and family. Stay as active as possible. Continue to learn and challenge yourself.   Things you can do to stay mentally active are:  Learn something new, like a foreign language or musical instrument.   Play SCRABBLE or do crossword puzzles. If you cannot find people to play these games with you at home, you can play them with others on your computer through the Internet.   Join a games club--anything from card games to chess or checkers or lawn bowling.   Start a new hobby.   Go back to school.   Volunteer.   Read.   Keep up with world events.  Activities of Daily Living    Your Health Risk Assessment indicates you have difficulties with activities of daily living such as housework, bathing, preparing meals, taking medication, etc. Please make a follow up appointment for us to address this issue in more detail.  Hearing Loss: Care Instructions  Overview     Hearing loss is a sudden or slow decrease in how well you hear. It can range from slight to profound. Permanent hearing loss can occur with aging. It also can happen when you are exposed long-term to loud noise. Examples include listening to loud music, riding motorcycles, or being around other loud machines.  Hearing loss can affect your work and  home life. It can make you feel lonely or depressed. You may feel that you have lost your independence. But hearing aids and other devices can help you hear better and feel connected to others.  Follow-up care is a key part of your treatment and safety. Be sure to make and go to all appointments, and call your doctor if you are having problems. It's also a good idea to know your test results and keep a list of the medicines you take.  How can you care for yourself at home?  Avoid loud noises whenever possible. This helps keep your hearing from getting worse.  Always wear hearing protection around loud noises.  Wear a hearing aid as directed.  A professional can help you pick a hearing aid that will work best for you.  You can also get hearing aids over the counter for mild to moderate hearing loss.  Have hearing tests as your doctor suggests. They can show whether your hearing has changed. Your hearing aid may need to be adjusted.  Use other devices as needed. These may include:  Telephone amplifiers and hearing aids that can connect to a television, stereo, radio, or microphone.  Devices that use lights or vibrations. These alert you to the doorbell, a ringing telephone, or a baby monitor.  Television closed-captioning. This shows the words at the bottom of the screen. Most new TVs can do this.  TTY (text telephone). This lets you type messages back and forth on the telephone instead of talking or listening. These devices are also called TDD. When messages are typed on the keyboard, they are sent over the phone line to a receiving TTY. The message is shown on a monitor.  Use text messaging, social media, and email if it is hard for you to communicate by telephone.  Try to learn a listening technique called speechreading. It is not lipreading. You pay attention to people's gestures, expressions, posture, and tone of voice. These clues can help you understand what a person is saying. Face the person you are talking  "to, and have them face you. Make sure the lighting is good. You need to see the other person's face clearly.  Think about counseling if you need help to adjust to your hearing loss.  When should you call for help?  Watch closely for changes in your health, and be sure to contact your doctor if:    You think your hearing is getting worse.     You have new symptoms, such as dizziness or nausea.   Where can you learn more?  Go to https://www.[email or web address].net/patiented  Enter R798 in the search box to learn more about \"Hearing Loss: Care Instructions.\"  Current as of: March 1, 2023               Content Version: 13.7    0732-[address]206 [employer].   Care instructions adapted under license by your healthcare professional. If you have questions about a medical condition or this instruction, always ask your healthcare professional. [employer] disclaims any warranty or liability for your use of this information.         " Left arm;

## 2023-10-13 ENCOUNTER — EMERGENCY (EMERGENCY)
Facility: HOSPITAL | Age: 33
LOS: 1 days | Discharge: HOME | End: 2023-10-13
Attending: EMERGENCY MEDICINE
Payer: COMMERCIAL

## 2023-10-13 VITALS
RESPIRATION RATE: 18 BRPM | SYSTOLIC BLOOD PRESSURE: 164 MMHG | WEIGHT: 315 LBS | HEART RATE: 106 BPM | OXYGEN SATURATION: 96 % | TEMPERATURE: 97 F | DIASTOLIC BLOOD PRESSURE: 97 MMHG

## 2023-10-13 LAB
FLUAV AG NPH QL: SIGNIFICANT CHANGE UP
FLUBV AG NPH QL: SIGNIFICANT CHANGE UP
RSV RNA NPH QL NAA+NON-PROBE: SIGNIFICANT CHANGE UP
SARS-COV-2 RNA SPEC QL NAA+PROBE: SIGNIFICANT CHANGE UP

## 2023-10-13 PROCEDURE — 94640 AIRWAY INHALATION TREATMENT: CPT

## 2023-10-13 PROCEDURE — 71045 X-RAY EXAM CHEST 1 VIEW: CPT

## 2023-10-13 PROCEDURE — 99283 EMERGENCY DEPT VISIT LOW MDM: CPT | Mod: 25

## 2023-10-13 PROCEDURE — 71045 X-RAY EXAM CHEST 1 VIEW: CPT | Mod: 26

## 2023-10-13 PROCEDURE — 99284 EMERGENCY DEPT VISIT MOD MDM: CPT

## 2023-10-13 PROCEDURE — 87637 SARSCOV2&INF A&B&RSV AMP PRB: CPT

## 2023-10-13 RX ORDER — IPRATROPIUM/ALBUTEROL SULFATE 18-103MCG
3 AEROSOL WITH ADAPTER (GRAM) INHALATION
Refills: 0 | Status: DISCONTINUED | OUTPATIENT
Start: 2023-10-13 | End: 2023-10-20

## 2023-10-13 RX ORDER — FLUTICASONE PROPIONATE 50 MCG
1 SPRAY, SUSPENSION NASAL
Refills: 0 | Status: DISCONTINUED | OUTPATIENT
Start: 2023-10-13 | End: 2023-10-20

## 2023-10-13 RX ORDER — AZITHROMYCIN 500 MG/1
1 TABLET, FILM COATED ORAL
Qty: 4 | Refills: 0
Start: 2023-10-13 | End: 2023-10-16

## 2023-10-13 RX ORDER — AZITHROMYCIN 500 MG/1
500 TABLET, FILM COATED ORAL ONCE
Refills: 0 | Status: DISCONTINUED | OUTPATIENT
Start: 2023-10-13 | End: 2023-10-20

## 2023-10-13 RX ADMIN — Medication 3 MILLILITER(S): at 01:33

## 2023-10-13 RX ADMIN — Medication 60 MILLIGRAM(S): at 01:33

## 2023-10-13 NOTE — ED PROVIDER NOTE - OBJECTIVE STATEMENT
32 year old male present to ED for wheezing. Pt states he has had cough, congestion for 4 days. Pt reports at 1300 yesterday began to wheeze, took rescue inhaler, 1 nebulizer treatment with mild relief. Pt denies fever, chills, SOB, chest pain, nausea, vomiting abd pain, back pain.

## 2023-10-13 NOTE — ED PROVIDER NOTE - PATIENT PORTAL LINK FT
Treatment Number: 2 You can access the FollowMyHealth Patient Portal offered by [facility] by registering at the following website: [email or web address]. By joining [facility]’s FollowMyHealth portal, you will also be able to view your health information using other applications (apps) compatible with our system.

## 2023-10-13 NOTE — ED PROVIDER NOTE - ATTENDING APP SHARED VISIT CONTRIBUTION OF CARE
Mild exacerbation of asthma likely environmental or viral trigger, comfortable work of breathing, no hypoxia., Bronchodilators, steroid.

## 2023-10-13 NOTE — ED PROVIDER NOTE - CLINICAL SUMMARY MEDICAL DECISION MAKING FREE TEXT BOX
32 year old male present to ED for wheezing. Pt states he has had cough, congestion for 4 days. Pt reports at 1300 yesterday began to wheeze, took rescue inhaler, 1 nebulizer treatment with mild relief. Pt denies fever, chills, SOB, chest pain, nausea, vomiting abd pain, back pain.   Xray, EKG, meds, re-assess 32 year old male present to ED for wheezing. Pt states he has had cough, congestion for 4 days. Pt reports at 1300 yesterday began to wheeze, took rescue inhaler, 1 nebulizer treatment with mild relief. Pt denies fever, chills, SOB, chest pain, nausea, vomiting abd pain, back pain.   Xray, meds, re-assess  Pt reassessed, pt feeling better at this time, vss, pt able to walk, talk and vocalized plan of action. Discussed in depth and explained to pt in depth the next steps that need to be taken including proper follow up with PCP or specialists. All incidental findings were discussed with pt as well. Pt verbalized their concerns and all questions were answered. Pt understands dispo and wants discharge. Given good instructions when to return to ED, strict return precautions and importance of f/u.

## 2023-10-13 NOTE — ED ADULT TRIAGE NOTE - CHIEF COMPLAINT QUOTE
patient states hx of asthma, about 1 hour ago he began to feel chest tightness so he took albuterol neb at home and had no relief. +congestion for several days. no fevers.

## 2023-10-13 NOTE — ED PROVIDER NOTE - PHYSICAL EXAMINATION
Gen: No acute distress, non toxic  HEENT: Mucous membranes moist, pink conjunctivae, EOMI  CV: RRR, nl s1/s2.  Resp: mild wheeze b/l lower lobes. , normal rate and effort  GI: Abdomen soft, NT, ND. No rebound, no guarding  Neuro: A&O x 3, moving all 4 extremities  MSK: No spine or joint tenderness to palpation  Skin: No rashes. intact and perfused.

## 2023-10-13 NOTE — ED ADULT NURSE NOTE - OBJECTIVE STATEMENT
PT reports to Ed with complaints of asthma exacerbation, PT reports using inhaler at home with out relief, mild wheezing noted bilaterally, no work of breathing noted

## 2024-01-26 NOTE — ED PROVIDER NOTE - NSFOLLOWUPINSTRUCTIONS_ED_ALL_ED_FT
No. - take medication as directed     Asthma    Asthma is a condition in which the airways tighten and narrow, making it difficult to breath. Asthma episodes, also called asthma attacks, range from minor to life-threatening. Symptoms include wheezing, coughing, chest tightness, or shortness of breath. The diagnosis of asthma is made by a review of your medical history and a physical exam, but may involve additional testing. Asthma cannot be cured, but medicines and lifestyle changes can help control it. Avoid triggers of asthma which may include animal dander, pollen, mold, smoke, air pollutants, etc.     SEEK IMMEDIATE MEDICAL CARE IF YOU HAVE ANY OF THE FOLLOWING SYMPTOMS: worsening of symptoms, shortness of breath at rest, chest pain, bluish discoloration to lips or fingertips, lightheadedness/dizziness, or fever.

## 2024-04-10 ENCOUNTER — EMERGENCY (EMERGENCY)
Facility: HOSPITAL | Age: 34
LOS: 1 days | Discharge: HOME | End: 2024-04-10
Attending: EMERGENCY MEDICINE
Payer: COMMERCIAL

## 2024-04-10 VITALS
HEART RATE: 94 BPM | RESPIRATION RATE: 20 BRPM | DIASTOLIC BLOOD PRESSURE: 97 MMHG | SYSTOLIC BLOOD PRESSURE: 163 MMHG | WEIGHT: 315 LBS | OXYGEN SATURATION: 97 % | TEMPERATURE: 97 F

## 2024-04-10 VITALS — HEART RATE: 90 BPM | OXYGEN SATURATION: 98 %

## 2024-04-10 PROCEDURE — 99285 EMERGENCY DEPT VISIT HI MDM: CPT | Mod: 25

## 2024-04-10 PROCEDURE — 99284 EMERGENCY DEPT VISIT MOD MDM: CPT

## 2024-04-10 PROCEDURE — 94640 AIRWAY INHALATION TREATMENT: CPT

## 2024-04-10 RX ORDER — IPRATROPIUM/ALBUTEROL SULFATE 18-103MCG
3 AEROSOL WITH ADAPTER (GRAM) INHALATION
Refills: 0 | Status: COMPLETED | OUTPATIENT
Start: 2024-04-10 | End: 2024-04-10

## 2024-04-10 RX ADMIN — Medication 3 MILLILITER(S): at 02:00

## 2024-04-10 RX ADMIN — Medication 3 MILLILITER(S): at 01:18

## 2024-04-10 RX ADMIN — Medication 3 MILLILITER(S): at 02:18

## 2024-04-10 RX ADMIN — Medication 60 MILLIGRAM(S): at 01:19

## 2024-04-10 NOTE — ED PROVIDER NOTE - PROGRESS NOTE DETAILS
DERRICK Moseley: Improved air entry. Feeling better. DERRICK Moseley: Continues to feel well. Felt well with ambulation trial.

## 2024-04-10 NOTE — ED PROVIDER NOTE - NSFOLLOWUPINSTRUCTIONS_ED_ALL_ED_FT
- Prescription sent to pharmacy.  - Please bring all documentation from your ED visit to any related future follow up appointment.  - Please call to schedule follow up appointment with your primary care physician within 24-48 hours.  - Please seek immediate medical attention or return to the ED for any new/worsening, signs/symptoms, or concerns.    Feel better!       Asthma Attack       Asthma attack, also called acute bronchospasm, is the sudden narrowing and tightening of the air passages, which limits the amount of oxygen that can get into the lungs. The narrowing is caused by inflammation and tightening of the muscles in the air tubes (bronchi) in the lungs.    Too much mucus is also produced, which narrows the airways more. This can cause trouble breathing, loud breathing (wheezing), and coughing. The goal of treatment is to open the airways in the lungs and reduce inflammation.    What are the causes?  Possible causes or triggers of this condition include:    Animal dander, dust mites, or cockroaches.  Mold, pollen from trees or grass, or cold air.  Air pollutants such as dust, household , aerosol sprays, strong chemicals, strong odors, and smoke of any kind.  Stress or strong emotions such as crying or laughing hard.  Exercise or activity that requires a lot of energy.  Substances in foods and drinks, such as dried fruits and wine, called sulfites.  Certain medicines or medical conditions such as:    Aspirin or beta-blockers.  Infections or inflammatory conditions, such as a flu (influenza), a cold, pneumonia, or inflammation of the nasal membranes (rhinitis).  Gastroesophageal reflux disease (GERD). GERD is a condition in which stomach acid backs up into your esophagus and spills into your trachea (windpipe), which can irritate your airways.    What are the signs or symptoms?  Symptoms of this condition include:    Wheezing. This may sound like whistling while breathing. This may only happen at night.  Excessive coughing. This may only happen at night.  Chest tightness or pain.  Shortness of breath.  Feeling like you cannot get enough air no matter how hard you breathe (air hunger).    How is this diagnosed?  This condition may be diagnosed based on:    Your medical history.  Your symptoms.  A physical exam.  Tests to check for other causes of your symptoms or other conditions that may have triggered your asthma attack. These tests may include:    A chest X-ray.  Blood tests.  Tests to assess lung function, such as breathing into a device that measures how much air you can inhale and exhale (spirometry).    How is this treated?  Treatment for this condition depends on the severity and cause of your asthma attack.    For mild attacks, you may receive medicines through a hand-held inhaler (metered dose inhaler, or MDI) or through a device that turns liquid medicine into a mist (nebulizer). These medicines include:    Quick relief or rescue medicines that quickly relax the airways and lungs.  Long-acting medicines that are used daily to prevent (control) your asthma symptoms.  For moderate or severe attacks, you may be treated with steroid medicines by mouth or through an IV injection at the hospital.  For severe attacks, you may need oxygen therapy or a breathing machine (ventilator).  If your asthma attack was caused by an infection from bacteria, you will be given antibiotic medicines.    Follow these instructions at home:      Medicines    Take over-the-counter and prescription medicines only as told by your health care provider.     Keep your medicines up-to-date.   Make sure you have all of your medicines available at all times.  If you were prescribed an antibiotic medicine, take it as told by your health care provider. Do not stop taking the antibiotic even if you start to feel better.  Tell your doctor if you may be pregnant to make sure your asthma medicine is safe to use during pregnancy.        Avoiding triggers     Keep track of things that trigger your asthma attacks. Avoid exposure to these triggers.  Do not use any products that contain nicotine or tobacco, such as cigarettes, e-cigarettes, and chewing tobacco. If you need help quitting, ask your health care provider.  When there is a lot of pollen, air pollution, or humidity, keep windows closed and use an air conditioner or go to places with air conditioning.        Asthma action plan    Work with your health care provider to make a written plan for managing and treating your asthma attacks (asthma action plan). This plan should include:    A list of your asthma triggers and how to avoid them.  A list of symptoms that you may have during an asthma attack.  Information about which medicine to take, when to take the medicine and how much of the medicine to take.  Information to help you understand your peak flow measurements.  Daily actions that you can take to control your asthma symptoms.  Contact information for your health care providers.  If you have an asthma attack, act quickly. Follow the emergency steps on your written asthma action plan. This may prevent you from needing to go to the hospital.        General instructions    Avoid excessive exercise or activity until your asthma attack goes away. Ask your health care provider what activities are safe for you and when you can return to your normal activities.  Stay up to date on all your vaccines, such as flu and pneumonia vaccines.  Drink enough fluid to keep your urine pale yellow. Staying hydrated helps keep mucus in your lungs thin so it can be coughed up easily.  Do not use alcohol until you have recovered.  Keep all follow-up visits as told by your health care provider. This is important. Asthma requires careful medical care.    Contact a health care provider if:  You have followed your action plan for 1 hour and your peak flow reading is still at 50–79%. This is in the yellow zone, which means "caution."  You need to use your quick reliever medicine more frequently than normal.  Your medicines are causing side effects, such as rash, itching, swelling, or trouble breathing.  Your symptoms do not improve after 48 hours.  You cough up mucus that is thicker than usual.  You have a fever.    Get help right away if:  Your peak flow reading is less than 50% of your personal best. This is in the red zone, which means "danger."  You have trouble breathing.  You develop chest pain or discomfort.  Your medicines no longer seem to be helping.  You are coughing up bloody mucus.  You have a fever and your symptoms suddenly get worse.  You have trouble swallowing.  You feel very tired, and breathing becomes tiring.    These symptoms may represent a serious problem that is an emergency. Do not wait to see if the symptoms will go away. Get medical help right away. Call your local emergency services (911 in the [place] Do not drive yourself to the hospital.    Summary  Asthma attacks are caused by narrowing or tightness in air passages, which causes shortness of breath, coughing, and loud breathing (wheezing).  Many things can trigger an asthma attack, such as allergens, weather changes, exercise, strong odors, and smoke of any kind.  If you have an asthma attack, act quickly. Follow the emergency steps on your written asthma action plan.  Get help right away if you have severe trouble breathing, chest pain, or fever, or if your home medicines are no longer helping with your symptoms.    ADDITIONAL NOTES AND INSTRUCTIONS    Please follow up with your Primary MD in 24-48 hr.  Seek immediate medical care for any new/worsening signs or symptoms. - Prescription sent to pharmacy.   - Compliance with hypertension medications.   - Please bring all documentation from your ED visit to any related future follow up appointment.  - Please call to schedule follow up appointment with your primary care physician within 24-48 hours.  - Please seek immediate medical attention or return to the ED for any new/worsening, signs/symptoms, or concerns.    Feel better!       Asthma Attack       Asthma attack, also called acute bronchospasm, is the sudden narrowing and tightening of the air passages, which limits the amount of oxygen that can get into the lungs. The narrowing is caused by inflammation and tightening of the muscles in the air tubes (bronchi) in the lungs.    Too much mucus is also produced, which narrows the airways more. This can cause trouble breathing, loud breathing (wheezing), and coughing. The goal of treatment is to open the airways in the lungs and reduce inflammation.    What are the causes?  Possible causes or triggers of this condition include:    Animal dander, dust mites, or cockroaches.  Mold, pollen from trees or grass, or cold air.  Air pollutants such as dust, household , aerosol sprays, strong chemicals, strong odors, and smoke of any kind.  Stress or strong emotions such as crying or laughing hard.  Exercise or activity that requires a lot of energy.  Substances in foods and drinks, such as dried fruits and wine, called sulfites.  Certain medicines or medical conditions such as:    Aspirin or beta-blockers.  Infections or inflammatory conditions, such as a flu (influenza), a cold, pneumonia, or inflammation of the nasal membranes (rhinitis).  Gastroesophageal reflux disease (GERD). GERD is a condition in which stomach acid backs up into your esophagus and spills into your trachea (windpipe), which can irritate your airways.    What are the signs or symptoms?  Symptoms of this condition include:    Wheezing. This may sound like whistling while breathing. This may only happen at night.  Excessive coughing. This may only happen at night.  Chest tightness or pain.  Shortness of breath.  Feeling like you cannot get enough air no matter how hard you breathe (air hunger).    How is this diagnosed?  This condition may be diagnosed based on:    Your medical history.  Your symptoms.  A physical exam.  Tests to check for other causes of your symptoms or other conditions that may have triggered your asthma attack. These tests may include:    A chest X-ray.  Blood tests.  Tests to assess lung function, such as breathing into a device that measures how much air you can inhale and exhale (spirometry).    How is this treated?  Treatment for this condition depends on the severity and cause of your asthma attack.    For mild attacks, you may receive medicines through a hand-held inhaler (metered dose inhaler, or MDI) or through a device that turns liquid medicine into a mist (nebulizer). These medicines include:    Quick relief or rescue medicines that quickly relax the airways and lungs.  Long-acting medicines that are used daily to prevent (control) your asthma symptoms.  For moderate or severe attacks, you may be treated with steroid medicines by mouth or through an IV injection at the hospital.  For severe attacks, you may need oxygen therapy or a breathing machine (ventilator).  If your asthma attack was caused by an infection from bacteria, you will be given antibiotic medicines.    Follow these instructions at home:      Medicines    Take over-the-counter and prescription medicines only as told by your health care provider.     Keep your medicines up-to-date.   Make sure you have all of your medicines available at all times.  If you were prescribed an antibiotic medicine, take it as told by your health care provider. Do not stop taking the antibiotic even if you start to feel better.  Tell your doctor if you may be pregnant to make sure your asthma medicine is safe to use during pregnancy.        Avoiding triggers     Keep track of things that trigger your asthma attacks. Avoid exposure to these triggers.  Do not use any products that contain nicotine or tobacco, such as cigarettes, e-cigarettes, and chewing tobacco. If you need help quitting, ask your health care provider.  When there is a lot of pollen, air pollution, or humidity, keep windows closed and use an air conditioner or go to places with air conditioning.        Asthma action plan    Work with your health care provider to make a written plan for managing and treating your asthma attacks (asthma action plan). This plan should include:    A list of your asthma triggers and how to avoid them.  A list of symptoms that you may have during an asthma attack.  Information about which medicine to take, when to take the medicine and how much of the medicine to take.  Information to help you understand your peak flow measurements.  Daily actions that you can take to control your asthma symptoms.  Contact information for your health care providers.  If you have an asthma attack, act quickly. Follow the emergency steps on your written asthma action plan. This may prevent you from needing to go to the hospital.        General instructions    Avoid excessive exercise or activity until your asthma attack goes away. Ask your health care provider what activities are safe for you and when you can return to your normal activities.  Stay up to date on all your vaccines, such as flu and pneumonia vaccines.  Drink enough fluid to keep your urine pale yellow. Staying hydrated helps keep mucus in your lungs thin so it can be coughed up easily.  Do not use alcohol until you have recovered.  Keep all follow-up visits as told by your health care provider. This is important. Asthma requires careful medical care.    Contact a health care provider if:  You have followed your action plan for 1 hour and your peak flow reading is still at 50–79%. This is in the yellow zone, which means "caution."  You need to use your quick reliever medicine more frequently than normal.  Your medicines are causing side effects, such as rash, itching, swelling, or trouble breathing.  Your symptoms do not improve after 48 hours.  You cough up mucus that is thicker than usual.  You have a fever.    Get help right away if:  Your peak flow reading is less than 50% of your personal best. This is in the red zone, which means "danger."  You have trouble breathing.  You develop chest pain or discomfort.  Your medicines no longer seem to be helping.  You are coughing up bloody mucus.  You have a fever and your symptoms suddenly get worse.  You have trouble swallowing.  You feel very tired, and breathing becomes tiring.    These symptoms may represent a serious problem that is an emergency. Do not wait to see if the symptoms will go away. Get medical help right away. Call your local emergency services (911 in the [place] Do not drive yourself to the hospital.    Summary  Asthma attacks are caused by narrowing or tightness in air passages, which causes shortness of breath, coughing, and loud breathing (wheezing).  Many things can trigger an asthma attack, such as allergens, weather changes, exercise, strong odors, and smoke of any kind.  If you have an asthma attack, act quickly. Follow the emergency steps on your written asthma action plan.  Get help right away if you have severe trouble breathing, chest pain, or fever, or if your home medicines are no longer helping with your symptoms.    ADDITIONAL NOTES AND INSTRUCTIONS    Please follow up with your Primary MD in 24-48 hr.  Seek immediate medical care for any new/worsening signs or symptoms.

## 2024-04-10 NOTE — ED PROVIDER NOTE - CARE PROVIDERS DIRECT ADDRESSES
[email or web address]

## 2024-04-10 NOTE — ED PROVIDER NOTE - PATIENT PORTAL LINK FT
No You can access the FollowMyHealth Patient Portal offered by [facility] by registering at the following website: [email or web address]. By joining [employer]’s FollowMyHealth portal, you will also be able to view your health information using other applications (apps) compatible with our system.

## 2024-04-10 NOTE — ED PROVIDER NOTE - OBJECTIVE STATEMENT
32 yo male PMHx asthma, HTN presents to ED c/o asthma exacerbation beginning 45 minutes ago. 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 Medicated with one albuterol pump prior to arrival, as was not home to use nebulizer treatment. Patient feels slightly improved since arriving to the ED. Has enough inhaler and nebulizer at home. Of note, patient admits to being poorly compliant with antihypertensive medications, did not take today. No further complaints at this time.   Denies h/o hospitalizations due to asthma, intubations, recent illness/travel/surgery, fever. 32 yo male PMHx asthma, HTN presents to ED c/o asthma exacerbation beginning 45 minutes ago. 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 Medicated with one albuterol pump prior to arrival, as was not home to use nebulizer treatment. Patient feels slightly improved since arriving to the ED. Has enough inhaler and nebulizer at home. Of note, patient admits to being poorly compliant with antihypertensive medications, did not take today. No further complaints at this time.   Denies h/o hospitalizations due to asthma, h/o blood clots, intubations, recent illness/travel/surgery, chest pain, fever.

## 2024-04-10 NOTE — ED PROVIDER NOTE - PROVIDER TOKENS
PROVIDER:[TOKEN:[[id number]]] PROVIDER:[TOKEN:[[id number]]],PROVIDER:[TOKEN:[[id number]]]

## 2024-04-10 NOTE — ED PROVIDER NOTE - CARE PROVIDER_API CALL
Donny Bhardwaj  Critical Care Medicine  [address] [place] [id number]  Phone: [phone]  Fax: [phone]  Follow Up Time:    Donny Bhardwaj  Critical Care Medicine  [address] [place] [id number]  Phone: [phone]  Fax: [phone]  Follow Up Time:     Sloane Ray  Allergy and Immunology  [address] [place] [id number]  Phone: [phone]  Fax: [phone]  Follow Up Time:

## 2024-04-10 NOTE — ED PROVIDER NOTE - PHYSICAL EXAMINATION
General: In no respiratory distress.  Skin: No rashes or lesions. Warm, dry, color normal for race.   Head: Normocephalic/atraumatic.   Eyes: Sclera anicteric, conjunctivae clear b/l. PERRLA, EOMI.   Throat: Airway patent. Tolerating secretions, no drooling.   Neck: Supple, FROM.   Cardio: Rate and rhythm regular. No audible murmur.  Resp: Speaking in full sentences. No visible nasal flaring or retractions. Poor air entry. Slight expiratory wheezes to RUL.   MSK: MAEx4. FROM.   Neuro: A&Ox3. Appears nonfocal. Normal gait.

## 2024-04-10 NOTE — ED PROVIDER NOTE - CLINICAL SUMMARY MEDICAL DECISION MAKING FREE TEXT BOX
32 yo male PMHx asthma, HTN presents to ED c/o asthma exacerbation beginning 45 minutes ago. Administered one pump of albuterol inhaler PTA with now improvement. HR and O2 appropriate. Hypertensive, but did not take medications today. 32 yo male PMHx asthma, HTN presents to ED c/o asthma exacerbation beginning 45 minutes ago. Administered one pump of albuterol inhaler PTA with now improvement. HR and O2 appropriate. Hypertensive, but did not take medications today. Not hypoxic. Clinically improved with treatments. Steroids Rx sent. Medically stable for discharge.

## 2024-04-10 NOTE — ED ADULT NURSE NOTE - OBJECTIVE STATEMENT
pt comes into ED A&Ox4 c/o asthma exacerbation, stated he took albuterol at home with no relief. no other complaints of pain or discomfort at this time.

## 2024-09-12 NOTE — ED ADULT TRIAGE NOTE - ACCOMPANIED BY
Addended by: JULIA BRUMFIELD on: 9/12/2024 02:05 PM     Modules accepted: Orders, Level of Service     Self

## 2025-05-21 ENCOUNTER — NON-APPOINTMENT (OUTPATIENT)
Age: 35
End: 2025-05-21

## 2025-05-23 ENCOUNTER — APPOINTMENT (OUTPATIENT)
Dept: PULMONOLOGY | Facility: CLINIC | Age: 35
End: 2025-05-23
Payer: MEDICAID

## 2025-05-23 PROCEDURE — 94729 DIFFUSING CAPACITY: CPT | Mod: TC

## 2025-05-23 PROCEDURE — 94060 EVALUATION OF WHEEZING: CPT | Mod: TC

## 2025-05-23 PROCEDURE — 94727 GAS DIL/WSHOT DETER LNG VOL: CPT | Mod: TC

## 2025-05-23 RX ORDER — ALBUTEROL SULFATE 2.5 MG/3ML
(2.5 MG/3ML) SOLUTION RESPIRATORY (INHALATION)
Qty: 0 | Refills: 0 | Status: COMPLETED | OUTPATIENT
Start: 2025-05-23

## 2025-05-23 RX ADMIN — Medication 0 0.083%: at 00:00

## 2025-06-25 NOTE — ED PROVIDER NOTE - ENMT, MLM
Airway patent, Nasal mucosa clear. Mouth with normal mucosa. Throat has no vesicles, no oropharyngeal exudates and uvula is midline. never

## 2025-07-02 PROBLEM — K52.9 PANCOLITIS: Status: ACTIVE | Noted: 2021-05-07

## 2025-08-26 ENCOUNTER — APPOINTMENT (OUTPATIENT)
Dept: CARDIOLOGY | Facility: CLINIC | Age: 35
End: 2025-08-26